# Patient Record
Sex: FEMALE | Race: WHITE | NOT HISPANIC OR LATINO | Employment: UNEMPLOYED | URBAN - METROPOLITAN AREA
[De-identification: names, ages, dates, MRNs, and addresses within clinical notes are randomized per-mention and may not be internally consistent; named-entity substitution may affect disease eponyms.]

---

## 2022-05-10 ENCOUNTER — APPOINTMENT (OUTPATIENT)
Dept: RADIOLOGY | Facility: MEDICAL CENTER | Age: 42
End: 2022-05-10
Attending: EMERGENCY MEDICINE

## 2022-05-10 ENCOUNTER — HOSPITAL ENCOUNTER (EMERGENCY)
Facility: MEDICAL CENTER | Age: 42
End: 2022-05-10
Attending: EMERGENCY MEDICINE

## 2022-05-10 VITALS
DIASTOLIC BLOOD PRESSURE: 74 MMHG | BODY MASS INDEX: 27.81 KG/M2 | HEART RATE: 65 BPM | OXYGEN SATURATION: 98 % | WEIGHT: 162.92 LBS | TEMPERATURE: 97.2 F | HEIGHT: 64 IN | SYSTOLIC BLOOD PRESSURE: 121 MMHG | RESPIRATION RATE: 18 BRPM

## 2022-05-10 DIAGNOSIS — R07.9 CHEST PAIN, UNSPECIFIED TYPE: ICD-10-CM

## 2022-05-10 DIAGNOSIS — I49.3 PVCS (PREMATURE VENTRICULAR CONTRACTIONS): ICD-10-CM

## 2022-05-10 LAB
ALBUMIN SERPL BCP-MCNC: 4.4 G/DL (ref 3.2–4.9)
ALBUMIN/GLOB SERPL: 1.6 G/DL
ALP SERPL-CCNC: 76 U/L (ref 30–99)
ALT SERPL-CCNC: 16 U/L (ref 2–50)
ANION GAP SERPL CALC-SCNC: 12 MMOL/L (ref 7–16)
AST SERPL-CCNC: 19 U/L (ref 12–45)
BASOPHILS # BLD AUTO: 0.7 % (ref 0–1.8)
BASOPHILS # BLD: 0.06 K/UL (ref 0–0.12)
BILIRUB SERPL-MCNC: 0.5 MG/DL (ref 0.1–1.5)
BUN SERPL-MCNC: 14 MG/DL (ref 8–22)
CALCIUM SERPL-MCNC: 9.4 MG/DL (ref 8.4–10.2)
CHLORIDE SERPL-SCNC: 105 MMOL/L (ref 96–112)
CO2 SERPL-SCNC: 23 MMOL/L (ref 20–33)
CREAT SERPL-MCNC: 0.97 MG/DL (ref 0.5–1.4)
EKG IMPRESSION: NORMAL
EKG IMPRESSION: NORMAL
EOSINOPHIL # BLD AUTO: 0.22 K/UL (ref 0–0.51)
EOSINOPHIL NFR BLD: 2.6 % (ref 0–6.9)
ERYTHROCYTE [DISTWIDTH] IN BLOOD BY AUTOMATED COUNT: 41.1 FL (ref 35.9–50)
GFR SERPLBLD CREATININE-BSD FMLA CKD-EPI: 74 ML/MIN/1.73 M 2
GLOBULIN SER CALC-MCNC: 2.7 G/DL (ref 1.9–3.5)
GLUCOSE SERPL-MCNC: 107 MG/DL (ref 65–99)
HCG SERPL QL: NEGATIVE
HCT VFR BLD AUTO: 44.5 % (ref 37–47)
HGB BLD-MCNC: 15.7 G/DL (ref 12–16)
IMM GRANULOCYTES # BLD AUTO: 0.02 K/UL (ref 0–0.11)
IMM GRANULOCYTES NFR BLD AUTO: 0.2 % (ref 0–0.9)
LYMPHOCYTES # BLD AUTO: 3.26 K/UL (ref 1–4.8)
LYMPHOCYTES NFR BLD: 38.2 % (ref 22–41)
MCH RBC QN AUTO: 31.8 PG (ref 27–33)
MCHC RBC AUTO-ENTMCNC: 35.3 G/DL (ref 33.6–35)
MCV RBC AUTO: 90.1 FL (ref 81.4–97.8)
MONOCYTES # BLD AUTO: 0.62 K/UL (ref 0–0.85)
MONOCYTES NFR BLD AUTO: 7.3 % (ref 0–13.4)
NEUTROPHILS # BLD AUTO: 4.35 K/UL (ref 2–7.15)
NEUTROPHILS NFR BLD: 51 % (ref 44–72)
NRBC # BLD AUTO: 0 K/UL
NRBC BLD-RTO: 0 /100 WBC
PLATELET # BLD AUTO: 242 K/UL (ref 164–446)
PMV BLD AUTO: 9.7 FL (ref 9–12.9)
POTASSIUM SERPL-SCNC: 3.8 MMOL/L (ref 3.6–5.5)
PROT SERPL-MCNC: 7.1 G/DL (ref 6–8.2)
RBC # BLD AUTO: 4.94 M/UL (ref 4.2–5.4)
SODIUM SERPL-SCNC: 140 MMOL/L (ref 135–145)
TROPONIN T SERPL-MCNC: <6 NG/L (ref 6–19)
TROPONIN T SERPL-MCNC: <6 NG/L (ref 6–19)
WBC # BLD AUTO: 8.5 K/UL (ref 4.8–10.8)

## 2022-05-10 PROCEDURE — 80053 COMPREHEN METABOLIC PANEL: CPT

## 2022-05-10 PROCEDURE — 71045 X-RAY EXAM CHEST 1 VIEW: CPT

## 2022-05-10 PROCEDURE — 93005 ELECTROCARDIOGRAM TRACING: CPT | Performed by: EMERGENCY MEDICINE

## 2022-05-10 PROCEDURE — 84484 ASSAY OF TROPONIN QUANT: CPT

## 2022-05-10 PROCEDURE — 99285 EMERGENCY DEPT VISIT HI MDM: CPT

## 2022-05-10 PROCEDURE — 84703 CHORIONIC GONADOTROPIN ASSAY: CPT

## 2022-05-10 PROCEDURE — 36415 COLL VENOUS BLD VENIPUNCTURE: CPT

## 2022-05-10 PROCEDURE — 85025 COMPLETE CBC W/AUTO DIFF WBC: CPT

## 2022-05-10 RX ORDER — IBUPROFEN 200 MG
400 TABLET ORAL EVERY 6 HOURS PRN
Status: SHIPPED | COMMUNITY
End: 2022-06-07

## 2022-05-10 ASSESSMENT — PAIN DESCRIPTION - PAIN TYPE: TYPE: ACUTE PAIN

## 2022-05-10 ASSESSMENT — LIFESTYLE VARIABLES
TOTAL SCORE: 0
HAVE PEOPLE ANNOYED YOU BY CRITICIZING YOUR DRINKING: NO
EVER FELT BAD OR GUILTY ABOUT YOUR DRINKING: NO
DO YOU DRINK ALCOHOL: NO
HAVE YOU EVER FELT YOU SHOULD CUT DOWN ON YOUR DRINKING: NO
CONSUMPTION TOTAL: INCOMPLETE
EVER HAD A DRINK FIRST THING IN THE MORNING TO STEADY YOUR NERVES TO GET RID OF A HANGOVER: NO
TOTAL SCORE: 0
TOTAL SCORE: 0

## 2022-05-10 NOTE — ED TRIAGE NOTES
"Chief Complaint   Patient presents with   • Chest Pain     Chest pain/tightness began 10 days ago accompanied by \"feels my heart is skipping\" however this morning she woke up feeling her heart was racing for about 10 minutes then went back to \"skipping beats\".  No fever, no cough, no nausea, no vomiting.       /97   Pulse 80   Temp 36.3 °C (97.4 °F) (Temporal)   Resp 18   Ht 1.626 m (5' 4\")   Wt 73.9 kg (162 lb 14.7 oz)   LMP 04/10/2022 (Within Months)   SpO2 98%   Breastfeeding No   BMI 27.97 kg/m²     Not covid vaccinated.  "

## 2022-05-10 NOTE — ED NOTES
0910 All results back, chart up for MD for re evaluation. poc update given to pt. No further questions at this time. Further orders and dispo pending

## 2022-05-10 NOTE — ED PROVIDER NOTES
"ED Provider Note    CHIEF COMPLAINT  Chief Complaint   Patient presents with   • Chest Pain     Chest pain/tightness began 10 days ago accompanied by \"feels my heart is skipping\" however this morning she woke up feeling her heart was racing for about 10 minutes then went back to \"skipping beats\".  No fever, no cough, no nausea, no vomiting.         HPI  Arabella Lux is a 42 y.o. female who presents for evaluation of chest discomfort.  The patient reports that around 10 days ago she started feeling some chest discomfort.  She also noticed symptoms of dyspepsia and reported \"heartburn \".  She denies pleuritic discomfort leg swelling or hemoptysis.  No report of any dyspnea or chest pain on exertion.  Pain does not radiate to her back or shoulder blades.  She specifically denies any strokelike symptom such as numbness weakness tingling to the arms legs or face.  No associated fevers chills or productive cough.  Patient does smoke cigarettes but does not partake in any alcohol or illicit drug use.  No associated or known history of early coronary artery disease or thromboembolic disease.    REVIEW OF SYSTEMS  See HPI for further details.  No high fevers chills night sweats weight loss all other systems are negative.     PAST MEDICAL HISTORY  Past Medical History:   Diagnosis Date   • Hx of viral meningitis 2007    States she has recurrent viral meningitis       FAMILY HISTORY  Noncontributory    SOCIAL HISTORY  Social History     Socioeconomic History   • Marital status:    Tobacco Use   • Smoking status: Current Every Day Smoker     Packs/day: 1.50     Types: Cigarettes   • Smokeless tobacco: Never Used   Vaping Use   • Vaping Use: Never used   Substance and Sexual Activity   • Alcohol use: Not Currently   • Drug use: Not Currently   Smoking history no illicit drug use    SURGICAL HISTORY  No past surgical history on file.  No major surgeries  CURRENT MEDICATIONS  Home Medications     Reviewed by Shari MC" "ANNY Riddle (Registered Nurse) on 05/10/22 at 0544  Med List Status: Complete   Medication Last Dose Status        Patient Kenneth Taking any Medications                       ALLERGIES  No Known Allergies    PHYSICAL EXAM  VITAL SIGNS: /75   Pulse 67   Temp 36.3 °C (97.4 °F) (Temporal)   Resp 18   Ht 1.626 m (5' 4\")   Wt 73.9 kg (162 lb 14.7 oz)   LMP 04/10/2022 (Within Months)   SpO2 97%   Breastfeeding No   BMI 27.97 kg/m²       Constitutional: Well developed, Well nourished, No acute distress, Non-toxic appearance.   HENT: Normocephalic, Atraumatic, Bilateral external ears normal, Oropharynx moist, No oral exudates, Nose normal.   Eyes: PERRLA, EOMI, Conjunctiva normal, No discharge.   Neck: Normal range of motion, No tenderness, Supple, No stridor.    Cardiovascular: Normal heart rate, Normal rhythm, No murmurs, No rubs, No gallops.   Thorax & Lungs: Normal breath sounds, No respiratory distress, No wheezing, No chest tenderness.   Abdomen: Bowel sounds normal, Soft, No tenderness, No masses, No pulsatile masses.   Skin: Warm, Dry, No erythema, No rash.   Back: No tenderness, No CVA tenderness.   Extremities: Intact distal pulses, No edema, No tenderness, No cyanosis, No clubbing.   Neurologic: Alert & oriented x 3, Normal motor function, Normal sensory function, No focal deficits noted.   Psychiatric: Anxious.     DX-CHEST-PORTABLE (1 VIEW)   Final Result      No acute cardiopulmonary abnormality.           Results for orders placed or performed during the hospital encounter of 05/10/22   CBC WITH DIFFERENTIAL   Result Value Ref Range    WBC 8.5 4.8 - 10.8 K/uL    RBC 4.94 4.20 - 5.40 M/uL    Hemoglobin 15.7 12.0 - 16.0 g/dL    Hematocrit 44.5 37.0 - 47.0 %    MCV 90.1 81.4 - 97.8 fL    MCH 31.8 27.0 - 33.0 pg    MCHC 35.3 (H) 33.6 - 35.0 g/dL    RDW 41.1 35.9 - 50.0 fL    Platelet Count 242 164 - 446 K/uL    MPV 9.7 9.0 - 12.9 fL    Neutrophils-Polys 51.00 44.00 - 72.00 %    Lymphocytes 38.20 " 22.00 - 41.00 %    Monocytes 7.30 0.00 - 13.40 %    Eosinophils 2.60 0.00 - 6.90 %    Basophils 0.70 0.00 - 1.80 %    Immature Granulocytes 0.20 0.00 - 0.90 %    Nucleated RBC 0.00 /100 WBC    Neutrophils (Absolute) 4.35 2.00 - 7.15 K/uL    Lymphs (Absolute) 3.26 1.00 - 4.80 K/uL    Monos (Absolute) 0.62 0.00 - 0.85 K/uL    Eos (Absolute) 0.22 0.00 - 0.51 K/uL    Baso (Absolute) 0.06 0.00 - 0.12 K/uL    Immature Granulocytes (abs) 0.02 0.00 - 0.11 K/uL    NRBC (Absolute) 0.00 K/uL   COMP METABOLIC PANEL   Result Value Ref Range    Sodium 140 135 - 145 mmol/L    Potassium 3.8 3.6 - 5.5 mmol/L    Chloride 105 96 - 112 mmol/L    Co2 23 20 - 33 mmol/L    Anion Gap 12.0 7.0 - 16.0    Glucose 107 (H) 65 - 99 mg/dL    Bun 14 8 - 22 mg/dL    Creatinine 0.97 0.50 - 1.40 mg/dL    Calcium 9.4 8.4 - 10.2 mg/dL    AST(SGOT) 19 12 - 45 U/L    ALT(SGPT) 16 2 - 50 U/L    Alkaline Phosphatase 76 30 - 99 U/L    Total Bilirubin 0.5 0.1 - 1.5 mg/dL    Albumin 4.4 3.2 - 4.9 g/dL    Total Protein 7.1 6.0 - 8.2 g/dL    Globulin 2.7 1.9 - 3.5 g/dL    A-G Ratio 1.6 g/dL   TROPONIN   Result Value Ref Range    Troponin T <6 6 - 19 ng/L   BETA-HCG QUALITATIVE SERUM   Result Value Ref Range    Beta-Hcg Qualitative Serum Negative Negative   ESTIMATED GFR   Result Value Ref Range    GFR (CKD-EPI) 74 >60 mL/min/1.73 m 2   TROPONIN   Result Value Ref Range    Troponin T <6 6 - 19 ng/L   EKG (NOW)   Result Value Ref Range    Report       [facility] Emergency Dept.    Test Date:  2022-05-10  Pt Name:    ROCIO ANTUNEZ                  Department: [facility]  MRN:        [id number]                      Room:  Gender:     Female                       Technician: [id number]  :        1980                   Requested By:CALEB SIMMONS  Order #:    [id number]                    Reading MD:    Measurements  Intervals                                Axis  Rate:       79                           P:          70  AR:         149                           QRS:        -51  QRSD:       90                           T:          37  QT:         375  QTc:        430    Interpretive Statements  Sinus rhythm  Probable left atrial enlargement  Inferior infarct, old  No previous ECG available for comparison     EKG   Result Value Ref Range    Report       [facility] Emergency Dept.    Test Date:  2022-05-10  Pt Name:    ROCIO ANTUNEZ                  Department[facility]  MRN:        [id number]                      [facility]   Gender:     Female                       Technician: [id number]  :        1980                   Requested By:BELLA JIMENEZ  Order #:    [id number]                    Reading MD:    Measurements  Intervals                                Axis  Rate:       75                           P:          67  NC:         153                          QRS:        -35  QRSD:       91                           T:          57  QT:         385  QTc:        430    Interpretive Statements  Sinus arrhythmia  Ventricular premature complex  Left axis deviation  Low voltage, precordial leads  Compared to ECG 05/10/2022 05:36:21  Ventricular premature complex(es) now present  Left-axis deviation now present  Low QRS voltage now present  Sinus rhythm no longer present  Myocardial infarct finding no longer pr esent         COURSE & MEDICAL DECISION MAKING  Pertinent Labs & Imaging studies reviewed. (See chart for details)  Patient presents here with what I would consider atypical chest discomfort.  Is been present for almost 2 weeks and she also has some dyspepsia and acid reflux type symptoms.  Other than smoking she does not have any significant objective risk factors for coronary artery disease including longstanding hypertension diabetes dyslipidemia or strong family history. HEART SCORE 1. Her EKG does not demonstrate any ischemia and her she had 2-hour serial troponins both of which are nondetectable.  Her PE RC score is 0.   Chest x-ray did not demonstrate any abnormalities such as heart failure or enlarged heart pneumonia etc.  She was on the monitor here for several hours.  She did have some frequent PVCs but no suggestion of nonsustained or sustained ventricular tachycardia.  Electrolytes are normal.  I have recommended we start her on an over-the-counter acid blocker and refer her to cardiology to determine if Zio patch or other sort of monitoring would be indicated    FINAL IMPRESSION  1.  Atypical chest pain  2.  Frequent PVCs         Electronically signed by: Red Mendoza M.D., 5/10/2022 6:04 AM

## 2022-05-31 ENCOUNTER — TELEPHONE (OUTPATIENT)
Dept: SCHEDULING | Facility: IMAGING CENTER | Age: 42
End: 2022-05-31

## 2022-05-31 SDOH — HEALTH STABILITY: MENTAL HEALTH
STRESS IS WHEN SOMEONE FEELS TENSE, NERVOUS, ANXIOUS, OR CAN'T SLEEP AT NIGHT BECAUSE THEIR MIND IS TROUBLED. HOW STRESSED ARE YOU?: TO SOME EXTENT

## 2022-05-31 SDOH — ECONOMIC STABILITY: TRANSPORTATION INSECURITY
IN THE PAST 12 MONTHS, HAS THE LACK OF TRANSPORTATION KEPT YOU FROM MEDICAL APPOINTMENTS OR FROM GETTING MEDICATIONS?: NO

## 2022-05-31 SDOH — ECONOMIC STABILITY: TRANSPORTATION INSECURITY
IN THE PAST 12 MONTHS, HAS LACK OF RELIABLE TRANSPORTATION KEPT YOU FROM MEDICAL APPOINTMENTS, MEETINGS, WORK OR FROM GETTING THINGS NEEDED FOR DAILY LIVING?: NO

## 2022-05-31 SDOH — HEALTH STABILITY: PHYSICAL HEALTH: ON AVERAGE, HOW MANY DAYS PER WEEK DO YOU ENGAGE IN MODERATE TO STRENUOUS EXERCISE (LIKE A BRISK WALK)?: 1 DAY

## 2022-05-31 SDOH — ECONOMIC STABILITY: INCOME INSECURITY: HOW HARD IS IT FOR YOU TO PAY FOR THE VERY BASICS LIKE FOOD, HOUSING, MEDICAL CARE, AND HEATING?: NOT VERY HARD

## 2022-05-31 SDOH — ECONOMIC STABILITY: HOUSING INSECURITY: IN THE LAST 12 MONTHS, HOW MANY PLACES HAVE YOU LIVED?: 1

## 2022-05-31 SDOH — ECONOMIC STABILITY: FOOD INSECURITY: WITHIN THE PAST 12 MONTHS, THE FOOD YOU BOUGHT JUST DIDN'T LAST AND YOU DIDN'T HAVE MONEY TO GET MORE.: NEVER TRUE

## 2022-05-31 SDOH — ECONOMIC STABILITY: INCOME INSECURITY: IN THE LAST 12 MONTHS, WAS THERE A TIME WHEN YOU WERE NOT ABLE TO PAY THE MORTGAGE OR RENT ON TIME?: NO

## 2022-05-31 SDOH — ECONOMIC STABILITY: TRANSPORTATION INSECURITY
IN THE PAST 12 MONTHS, HAS LACK OF TRANSPORTATION KEPT YOU FROM MEETINGS, WORK, OR FROM GETTING THINGS NEEDED FOR DAILY LIVING?: NO

## 2022-05-31 SDOH — ECONOMIC STABILITY: HOUSING INSECURITY
IN THE LAST 12 MONTHS, WAS THERE A TIME WHEN YOU DID NOT HAVE A STEADY PLACE TO SLEEP OR SLEPT IN A SHELTER (INCLUDING NOW)?: NO

## 2022-05-31 SDOH — ECONOMIC STABILITY: FOOD INSECURITY: WITHIN THE PAST 12 MONTHS, YOU WORRIED THAT YOUR FOOD WOULD RUN OUT BEFORE YOU GOT MONEY TO BUY MORE.: NEVER TRUE

## 2022-05-31 SDOH — HEALTH STABILITY: PHYSICAL HEALTH: ON AVERAGE, HOW MANY MINUTES DO YOU ENGAGE IN EXERCISE AT THIS LEVEL?: 20 MIN

## 2022-05-31 ASSESSMENT — SOCIAL DETERMINANTS OF HEALTH (SDOH)
IN A TYPICAL WEEK, HOW MANY TIMES DO YOU TALK ON THE PHONE WITH FAMILY, FRIENDS, OR NEIGHBORS?: ONCE A WEEK
HOW OFTEN DO YOU HAVE A DRINK CONTAINING ALCOHOL: NEVER
DO YOU BELONG TO ANY CLUBS OR ORGANIZATIONS SUCH AS CHURCH GROUPS UNIONS, FRATERNAL OR ATHLETIC GROUPS, OR SCHOOL GROUPS?: NO
HOW OFTEN DO YOU ATTEND CHURCH OR RELIGIOUS SERVICES?: MORE THAN 4 TIMES PER YEAR
HOW HARD IS IT FOR YOU TO PAY FOR THE VERY BASICS LIKE FOOD, HOUSING, MEDICAL CARE, AND HEATING?: NOT VERY HARD
HOW MANY DRINKS CONTAINING ALCOHOL DO YOU HAVE ON A TYPICAL DAY WHEN YOU ARE DRINKING: PATIENT DOES NOT DRINK
WITHIN THE PAST 12 MONTHS, YOU WORRIED THAT YOUR FOOD WOULD RUN OUT BEFORE YOU GOT THE MONEY TO BUY MORE: NEVER TRUE
DO YOU BELONG TO ANY CLUBS OR ORGANIZATIONS SUCH AS CHURCH GROUPS UNIONS, FRATERNAL OR ATHLETIC GROUPS, OR SCHOOL GROUPS?: NO
IN A TYPICAL WEEK, HOW MANY TIMES DO YOU TALK ON THE PHONE WITH FAMILY, FRIENDS, OR NEIGHBORS?: ONCE A WEEK
HOW OFTEN DO YOU GET TOGETHER WITH FRIENDS OR RELATIVES?: ONCE A WEEK
HOW OFTEN DO YOU HAVE SIX OR MORE DRINKS ON ONE OCCASION: NEVER
HOW OFTEN DO YOU GET TOGETHER WITH FRIENDS OR RELATIVES?: ONCE A WEEK
HOW OFTEN DO YOU ATTEND CHURCH OR RELIGIOUS SERVICES?: MORE THAN 4 TIMES PER YEAR

## 2022-05-31 ASSESSMENT — LIFESTYLE VARIABLES
HOW OFTEN DO YOU HAVE SIX OR MORE DRINKS ON ONE OCCASION: NEVER
HOW MANY STANDARD DRINKS CONTAINING ALCOHOL DO YOU HAVE ON A TYPICAL DAY: PATIENT DOES NOT DRINK
HOW OFTEN DO YOU HAVE A DRINK CONTAINING ALCOHOL: NEVER
AUDIT-C TOTAL SCORE: 0
SKIP TO QUESTIONS 9-10: 1

## 2022-06-07 ENCOUNTER — OFFICE VISIT (OUTPATIENT)
Dept: MEDICAL GROUP | Facility: LAB | Age: 42
End: 2022-06-07
Payer: COMMERCIAL

## 2022-06-07 VITALS
HEIGHT: 64 IN | WEIGHT: 164 LBS | DIASTOLIC BLOOD PRESSURE: 68 MMHG | TEMPERATURE: 97.8 F | RESPIRATION RATE: 16 BRPM | BODY MASS INDEX: 28 KG/M2 | SYSTOLIC BLOOD PRESSURE: 104 MMHG | HEART RATE: 96 BPM | OXYGEN SATURATION: 98 %

## 2022-06-07 DIAGNOSIS — Z76.89 ENCOUNTER TO ESTABLISH CARE WITH NEW DOCTOR: ICD-10-CM

## 2022-06-07 DIAGNOSIS — F41.9 ANXIETY: ICD-10-CM

## 2022-06-07 DIAGNOSIS — Z12.39 ENCOUNTER FOR BREAST CANCER SCREENING USING NON-MAMMOGRAM MODALITY: ICD-10-CM

## 2022-06-07 DIAGNOSIS — N92.6 IRREGULAR MENSES: ICD-10-CM

## 2022-06-07 DIAGNOSIS — G03.2 MOLLARET'S SYNDROME (BENIGN RECURRENT MENINGITIS): ICD-10-CM

## 2022-06-07 DIAGNOSIS — I49.3 PVC (PREMATURE VENTRICULAR CONTRACTION): ICD-10-CM

## 2022-06-07 DIAGNOSIS — R53.83 OTHER FATIGUE: ICD-10-CM

## 2022-06-07 PROCEDURE — 99204 OFFICE O/P NEW MOD 45 MIN: CPT | Performed by: FAMILY MEDICINE

## 2022-06-07 ASSESSMENT — FIBROSIS 4 INDEX: FIB4 SCORE: 0.84

## 2022-06-07 ASSESSMENT — PATIENT HEALTH QUESTIONNAIRE - PHQ9
5. POOR APPETITE OR OVEREATING: 2 - MORE THAN HALF THE DAYS
CLINICAL INTERPRETATION OF PHQ2 SCORE: 1
SUM OF ALL RESPONSES TO PHQ QUESTIONS 1-9: 10

## 2022-06-07 NOTE — PROGRESS NOTES
CC: Here to establish care, with multiple medical concerns    HPI: New patient  Arabella presents today to establish care, 42 years old female, with past medical history significant for history of PVCs, history of recurrent meningitis.  Moved recently from [place], discussed the following today:    1. Encounter to establish care with new doctor  Reviewed past medical problems, past surgical history, family/social history, and medications.  Patient is no records available at this time.  Requesting records today.  Moved recently from [place], lives with her , works as part-time.    2. PVC (premature ventricular contraction)  Status post recent ER visit, patient has an appointment to establish and follow-up with cardiology for further evaluation of PVCs.  Records from the hospital reviewed.  Denies palpitations at this time but overall not feeling well    3. 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  Denies symptoms like neck rigidity or pain, reports mild headache on and off.    4. Irregular menses  History of irregular menstruation, this is chronic for the patient for the past few years.  Reports some occasional headaches sometimes, would like further work-up with hormonal check last menstruation 2 months ago.    5. Other fatigue  Chronic for the patient, new to me patient said for almost the past 2 years has been experiencing fatigue low energy tiredness occasional headaches, just not feeling well overall associated with weight gain and hair loss.  Patient also relates it to psychological trauma and event that she did not want to talk about today.  Reports weight gain.      6. Anxiety  Patient reports that around 1 or 2 years ago she had a severe psychological trauma that affected her health in general.  And she has been feeling very tired and anxious all the time,  interested in psychotherapy referral, denies intentions to harm self or others, patient did not want to talk much about the psychological trauma at this time.  But she was very emotional when she was asked about it.    7. Encounter for breast cancer screening using non-mammogram modality  Declines mammogram  Patient is requesting an order for ultrasound for breast cancer screening, denies mass or pain or nipple discharge.      Patient Active Problem List    Diagnosis Date Noted   • Encounter to establish care with new doctor 06/07/2022   • PVC (premature ventricular contraction) 06/07/2022   • Mollaret's syndrome (benign recurrent meningitis) 06/07/2022   • Irregular menses 06/07/2022   • Anxiety 06/07/2022       No current outpatient medications on file.     No current facility-administered medications for this visit.         Allergies as of 06/07/2022 - Reviewed 06/07/2022   Allergen Reaction Noted   • Clindamycin Vomiting and Nausea 05/10/2022   • Sulfa drugs  06/07/2022        ROS: Denies any chest pain, Shortness of breath, Changes bowel or bladder, Lower extremity edema.    Physical Exam:  Gen.: Well-developed, well-nourished, no apparent distress,pleasant and cooperative with the examination  Skin:  Warm and dry with good turgor. No rashes or suspicious lesions in visible areas  Eye: PERRLA, conjunctiva and sclera clear, lids normal  HEENT: Normocephalic/atraumatic, sinuses nontender with palpation, TMs clear, nares patent with pink mucosa and clear rhinorrhea, lips without lesions, oropharynx clear.  Neck: Trachea midline,no masses or adenopathy  Thyroid: normal consistency and size. No masses or nodules. Not tender with palpation.  Cor: Regular rate and rhythm without murmur, gallop or rub.  Lungs: Respirations unlabored.Clear to auscultation with equal breath sounds bilaterally. No wheezes, rhonchi.  Abdomen: Soft nontender without hepatosplenomegaly or masses appreciated, normoactive bowel sounds. No  hernias.  Extremities: No cyanosis, clubbing or edema, Symmetrical without deformities or malformations. Pulses 2+ and symmetrical both upper and lower extremities  Lymphatic: No abnormal adenopathy of the neck groin or axillae.  Psych: Alert and oriented x 3.Normal affect, judgement,insight and memory.        Assessment and Plan.   42 y.o. female **here to establish care    1. Encounter to establish care with new doctor  **Reviewed medical history as above    2. PVC (premature ventricular contraction)   new concern status post ER visit.  Patient has an appointment to establish with cardiology for further evaluation, advised to check thyroid function, and lipid profile.  Differential diagnosis might also includes anxiety  - TSH; Future  - FREE THYROXINE; Future  - Lipid Profile; Future    3. Mollaret's syndrome (benign recurrent meningitis)  Chronic for the patient, asymptomatic at this time no red flags.  Watchful waiting recommended    4. Irregular menses  Chronic for the patient, new to me, advised to do some labs for further evaluation  - FSH/LH; Future  - PROLACTIN; Future    5. Other fatigue  Chronic for the patient, new to me.  Advised to do more labs, reviewed most recent labs at emergency room, no evidence of anemia or kidney disease.  - VITAMIN D,25 HYDROXY; Future    6. Anxiety  Chronic, patient to follow-up with psychotherapy for further evaluation, did not want to talk about the psychological trauma at this time.  - Referral to Behavioral Health    7. Encounter for breast cancer screening using non-mammogram modality  Declines mammogram.  Never had one before asymptomatic  - US-BREAST BILAT-COMPLETE; Future       Please note that this dictation was created using voice recognition software. I have made every reasonable attempt to correct obvious errors but there may be errors of grammar and content that I may have overlooked prior to finalization of this note.

## 2022-06-16 ENCOUNTER — HOSPITAL ENCOUNTER (OUTPATIENT)
Dept: LAB | Facility: MEDICAL CENTER | Age: 42
End: 2022-06-16
Attending: FAMILY MEDICINE
Payer: COMMERCIAL

## 2022-06-16 DIAGNOSIS — I49.3 PVC (PREMATURE VENTRICULAR CONTRACTION): ICD-10-CM

## 2022-06-16 DIAGNOSIS — N92.6 IRREGULAR MENSES: ICD-10-CM

## 2022-06-16 DIAGNOSIS — R53.83 OTHER FATIGUE: ICD-10-CM

## 2022-06-16 LAB
25(OH)D3 SERPL-MCNC: 27 NG/ML (ref 30–100)
CHOLEST SERPL-MCNC: 195 MG/DL (ref 100–199)
HDLC SERPL-MCNC: 50 MG/DL
LDLC SERPL CALC-MCNC: 115 MG/DL
LH SERPL-ACNC: 27.6 IU/L
PROLACTIN SERPL-MCNC: 6.17 NG/ML (ref 2.8–26)
T4 FREE SERPL-MCNC: 1.04 NG/DL (ref 0.93–1.7)
TRIGL SERPL-MCNC: 151 MG/DL (ref 0–149)
TSH SERPL DL<=0.005 MIU/L-ACNC: 1.55 UIU/ML (ref 0.38–5.33)

## 2022-06-16 PROCEDURE — 84146 ASSAY OF PROLACTIN: CPT

## 2022-06-16 PROCEDURE — 83001 ASSAY OF GONADOTROPIN (FSH): CPT | Mod: 91

## 2022-06-16 PROCEDURE — 84439 ASSAY OF FREE THYROXINE: CPT

## 2022-06-16 PROCEDURE — 82306 VITAMIN D 25 HYDROXY: CPT

## 2022-06-16 PROCEDURE — 83002 ASSAY OF GONADOTROPIN (LH): CPT

## 2022-06-16 PROCEDURE — 84443 ASSAY THYROID STIM HORMONE: CPT

## 2022-06-16 PROCEDURE — 36415 COLL VENOUS BLD VENIPUNCTURE: CPT

## 2022-06-16 PROCEDURE — 80061 LIPID PANEL: CPT

## 2022-06-17 LAB — MISCELLANEOUS LAB RESULT MISCLAB: NORMAL

## 2022-06-17 PROCEDURE — 36415 COLL VENOUS BLD VENIPUNCTURE: CPT

## 2022-06-19 LAB — TEST NAME 95000: NORMAL

## 2022-06-21 DIAGNOSIS — R53.83 OTHER FATIGUE: ICD-10-CM

## 2022-06-23 ENCOUNTER — HOSPITAL ENCOUNTER (OUTPATIENT)
Dept: LAB | Facility: MEDICAL CENTER | Age: 42
End: 2022-06-23
Attending: FAMILY MEDICINE
Payer: COMMERCIAL

## 2022-06-23 LAB — CORTIS SERPL-MCNC: 5.1 UG/DL (ref 0–23)

## 2022-06-23 PROCEDURE — 36415 COLL VENOUS BLD VENIPUNCTURE: CPT

## 2022-06-23 PROCEDURE — 82533 TOTAL CORTISOL: CPT

## 2022-06-28 ENCOUNTER — DOCUMENTATION (OUTPATIENT)
Dept: BEHAVIORAL HEALTH | Facility: MEDICAL CENTER | Age: 42
End: 2022-06-28
Payer: COMMERCIAL

## 2022-06-28 ENCOUNTER — HOSPITAL ENCOUNTER (OUTPATIENT)
Dept: BEHAVIORAL HEALTH | Facility: MEDICAL CENTER | Age: 42
End: 2022-06-28
Attending: PSYCHIATRY & NEUROLOGY
Payer: COMMERCIAL

## 2022-06-28 DIAGNOSIS — F41.1 GAD (GENERALIZED ANXIETY DISORDER): ICD-10-CM

## 2022-06-28 DIAGNOSIS — F32.1 CURRENT MODERATE EPISODE OF MAJOR DEPRESSIVE DISORDER, UNSPECIFIED WHETHER RECURRENT (HCC): ICD-10-CM

## 2022-06-28 PROCEDURE — 90832 PSYTX W PT 30 MINUTES: CPT | Performed by: MARRIAGE & FAMILY THERAPIST

## 2022-06-28 ASSESSMENT — ANXIETY QUESTIONNAIRES
IF YOU CHECKED OFF ANY PROBLEMS ON THIS QUESTIONNAIRE, HOW DIFFICULT HAVE THESE PROBLEMS MADE IT FOR YOU TO DO YOUR WORK, TAKE CARE OF THINGS AT HOME, OR GET ALONG WITH OTHER PEOPLE: SOMEWHAT DIFFICULT
3. WORRYING TOO MUCH ABOUT DIFFERENT THINGS: SEVERAL DAYS
4. TROUBLE RELAXING: SEVERAL DAYS
GAD7 TOTAL SCORE: 9
2. NOT BEING ABLE TO STOP OR CONTROL WORRYING: SEVERAL DAYS
6. BECOMING EASILY ANNOYED OR IRRITABLE: MORE THAN HALF THE DAYS
5. BEING SO RESTLESS THAT IT IS HARD TO SIT STILL: NOT AT ALL
7. FEELING AFRAID AS IF SOMETHING AWFUL MIGHT HAPPEN: MORE THAN HALF THE DAYS
1. FEELING NERVOUS, ANXIOUS, OR ON EDGE: MORE THAN HALF THE DAYS

## 2022-06-28 ASSESSMENT — PATIENT HEALTH QUESTIONNAIRE - PHQ9
SUM OF ALL RESPONSES TO PHQ QUESTIONS 1-9: 14
5. POOR APPETITE OR OVEREATING: 2 - MORE THAN HALF THE DAYS
CLINICAL INTERPRETATION OF PHQ2 SCORE: 4

## 2022-06-28 NOTE — PROGRESS NOTES
[facility]  Assessment Center Note    Patient Name: Arabella Lux  Patient MRN: [id number]  Today's Date: 6/28/2022     Length of session: 30 minutes  Persons in attendance:Patient     Subjective/New Info: Please see scan of Triage assessment in Media tab    Depression Screen (PHQ-2/PHQ-9) 6/7/2022 6/28/2022   PHQ-2 Total Score 1 4   PHQ-9 Total Score 10 14       Interpretation of PHQ-9 Total Score   Score Severity   1-4 No Depression   5-9 Mild Depression   10-14 Moderate Depression   15-19 Moderately Severe Depression   20-27 Severe Depression    GHADA 7 6/28/2022   GHADA-7 Total Score 9       Interpretation of GHADA 7 Total Score   Score Severity:  0-4 No Anxiety   5-9 Mild Anxiety  10-14 Moderate Anxiety  15-21 Severe Anxiety    Objective/Observations:   Participation: Active verbal participation   Grooming: Casual   Cognition: Alert and Fully Oriented   Eye contact: Indirect   Mood: Depressed and Anxious   Affect: Constricted   Thought process: Logical and Goal-directed   Speech: Rate within normal limits and Volume within normal limits   Other:     [place] Suicide Severity Rating Scale     1. Wish to be Dead?:    2. Suicidal Thoughts: No    3. Suicidal Thoughts with Method Without Specific Plan or Intent to Act:    4. Suicidal Intent Without Specific Plan:    5. Suicide Intent with Specific Plan:    6. Suicide Behavior Question: No  How long ago did you do any of these?:    C-SSRS Risk Level:      Additional Suicide Screening Questions    Suspected or Confirmed Suicide Attempted?: No  Harming or killing others?: No    [place] Suicide Reassessment     New or continued thoughts about killing self?: No  Preparing to end life?: No    Diagnoses:   1. Current moderate episode of major depressive disorder, unspecified whether recurrent (HCC)    2. GHADA (generalized anxiety disorder)         Current risk:   SUICIDE: Low   Homicide: Low   Self-harm: Low   Relapse: Low   Other:    Safety Plan reviewed? Not  Indicated   If evidence of imminent risk is present, intervention/plan:     Plan:  Patient will schedule for outpatient counseling and outpatient psychiatry and was provided information on the IOP program at [facility].  She was provided contact information.  Patient would benefit from IOP participation to reduce symptoms of anxiety and depression.    NICHOLAS Johnson.  6/28/2022

## 2022-06-30 ENCOUNTER — OFFICE VISIT (OUTPATIENT)
Dept: MEDICAL GROUP | Facility: LAB | Age: 42
End: 2022-06-30
Payer: COMMERCIAL

## 2022-06-30 VITALS
RESPIRATION RATE: 16 BRPM | HEIGHT: 64 IN | BODY MASS INDEX: 28.34 KG/M2 | SYSTOLIC BLOOD PRESSURE: 110 MMHG | WEIGHT: 166 LBS | HEART RATE: 82 BPM | TEMPERATURE: 97.4 F | OXYGEN SATURATION: 97 % | DIASTOLIC BLOOD PRESSURE: 70 MMHG

## 2022-06-30 DIAGNOSIS — R79.89 LOW VITAMIN D LEVEL: ICD-10-CM

## 2022-06-30 DIAGNOSIS — E78.49 OTHER HYPERLIPIDEMIA: ICD-10-CM

## 2022-06-30 DIAGNOSIS — N92.6 IRREGULAR MENSTRUATION: ICD-10-CM

## 2022-06-30 DIAGNOSIS — F41.8 DEPRESSION WITH ANXIETY: ICD-10-CM

## 2022-06-30 PROCEDURE — 99214 OFFICE O/P EST MOD 30 MIN: CPT | Performed by: FAMILY MEDICINE

## 2022-06-30 RX ORDER — ERGOCALCIFEROL 1.25 MG/1
50000 CAPSULE ORAL
Qty: 12 CAPSULE | Refills: 0 | Status: SHIPPED | OUTPATIENT
Start: 2022-06-30

## 2022-06-30 RX ORDER — VENLAFAXINE HYDROCHLORIDE 37.5 MG/1
37.5 CAPSULE, EXTENDED RELEASE ORAL DAILY
Qty: 30 CAPSULE | Refills: 3 | Status: SHIPPED | OUTPATIENT
Start: 2022-06-30 | End: 2022-07-07

## 2022-06-30 ASSESSMENT — FIBROSIS 4 INDEX: FIB4 SCORE: 0.84

## 2022-06-30 NOTE — PROGRESS NOTES
Chief Complaint:   Chief Complaint   Patient presents with   • Follow-Up       HPI: Established patient  Arabella Lux is a 42 y.o. female who presents for follow-up, discussed the following today:    1. Depression with anxiety  Chronic, ongoing for almost 2 years now.  Patient has an appointment to establish with behavioral health in 1 month and a half.  Denies suicidal ideation at this time.  Discussed with the patient to start her on medication to see if that can help, all labs reviewed and discussed with the patient today    2. Low vitamin D level  Subnormal level of vitamin D, discussed with the patient supplements to improve the level    3. Other hyperlipidemia  Discussed with the patient elevation of triglycerides 151, .  No previous labs to compare    4. Irregular menstruation  History of irregular menstruation for the past 1 year, no pelvic pain, associated with decreased libido, discussed pelvic ultrasound and follow-up with gynecology today          Past medical history, family history, social history and medications reviewed and updated in the record.  Today  Current medications, problem list and allergies reviewed in [employer] today  Health maintenance topics are reviewed and updated.    Patient Active Problem List    Diagnosis Date Noted   • Encounter to establish care with new doctor 06/07/2022   • Depression with anxiety 06/30/2022   • Low vitamin D level 06/30/2022   • Other hyperlipidemia 06/30/2022   • PVC (premature ventricular contraction) 06/07/2022   • Mollaret's syndrome (benign recurrent meningitis) 06/07/2022   • Irregular menses 06/07/2022   • Anxiety 06/07/2022     Family History   Problem Relation Age of Onset   • Alcohol abuse Father    • Cancer Paternal Uncle    • Cancer Maternal Grandmother         breast ca   • Breast Cancer Maternal Grandmother    • Diabetes Maternal Grandmother    • Glaucoma Maternal Grandmother      Social History     Socioeconomic History   • Marital  status:      Spouse name: Not on file   • Number of children: Not on file   • Years of education: Not on file   • Highest education level: Associate degree: occupational, technical, or vocational program   Occupational History   • Occupation: part time   Tobacco Use   • Smoking status: Current Every Day Smoker     Packs/day: 1.50     Years: 25.00     Pack years: 37.50     Types: Cigarettes   • Smokeless tobacco: Never Used   Vaping Use   • Vaping Use: Never used   Substance and Sexual Activity   • Alcohol use: Never   • Drug use: Never   • Sexual activity: Yes     Partners: Male   Other Topics Concern   • Not on file   Social History Narrative   • Not on file     Social Determinants of Health     Financial Resource Strain: Low Risk    • Difficulty of Paying Living Expenses: Not very hard   Food Insecurity: No Food Insecurity   • Worried About Running Out of Food in the Last Year: Never true   • Ran Out of Food in the Last Year: Never true   Transportation Needs: No Transportation Needs   • Lack of Transportation (Medical): No   • Lack of Transportation (Non-Medical): No   Physical Activity: Insufficiently Active   • Days of Exercise per Week: 1 day   • Minutes of Exercise per Session: 20 min   Stress: Stress Concern Present   • Feeling of Stress : To some extent   Social Connections: Moderately Isolated   • Frequency of Communication with Friends and Family: Once a week   • Frequency of Social Gatherings with Friends and Family: Once a week   • Attends Taoist Services: More than 4 times per year   • Active Member of Clubs or Organizations: No   • Attends Club or Organization Meetings: Not on file   • Marital Status:    Intimate Partner Violence: Not on file   Housing Stability: Low Risk    • Unable to Pay for Housing in the Last Year: No   • Number of Places Lived in the Last Year: 1   • Unstable Housing in the Last Year: No           Review Of Systems  As documented in HPI above  PHYSICAL  "EXAMINATION:    /70 (BP Location: Right arm, Patient Position: Sitting, BP Cuff Size: Adult)   Pulse 82   Temp 36.3 °C (97.4 °F) (Temporal)   Resp 16   Ht 1.626 m (5' 4\")   Wt 75.3 kg (166 lb)   SpO2 97%   BMI 28.49 kg/m²   Gen.: Well-developed, well-nourished, no apparent distress, pleasant and cooperative with the examination  HEENT: Normocephalic/atraumatic,   Neck: No JVD or bruits, no adenopathy  Cor: Regular rate and rhythm without murmur gallop or rub                ASSESSMENT/Plan:  1. Depression with anxiety   chronic, ongoing.  Discussed labs, thyroid function within normal limits, start Effexor as directed and follow-up in 4 weeks, also follow-up with behavioral health as directed.  No red flags today    venlafaxine XR (EFFEXOR XR) 37.5 MG CAPSULE SR 24 HR   2. Low vitamin D level   patient to take supplements of vitamin D and recheck the level in 3 months    ergocalciferol (DRISDOL) 68549 UNIT capsule   3. Other hyperlipidemia   discussed CT calcium scoring to assess cardiac risk CT-CARDIAC SCORING    CANCELED: CT-HEART W/O CONT EVAL CALCIUM   4. Irregular menstruation   chronic, ongoing.  Do an ultrasound and follow-up with gynecology for further evaluation.  Possibly related to dysfunctional uterine bleeding or hormonal imbalance, labs reviewed normal thyroid function and normal FSH LH and cortisol level.  HCG QUAL SERUM    US-PELVIC COMPLETE (TRANSABDOMINAL/TRANSVAGINAL) (COMBO)    Referral to Gynecology     Please note that this dictation was created using voice recognition software. I have made every reasonable attempt to correct obvious errors but there may be errors of grammar and content that I may have overlooked prior to finalization of this note.       "

## 2022-07-01 ENCOUNTER — HOSPITAL ENCOUNTER (OUTPATIENT)
Dept: BEHAVIORAL HEALTH | Facility: MEDICAL CENTER | Age: 42
End: 2022-07-01
Attending: PSYCHIATRY & NEUROLOGY
Payer: COMMERCIAL

## 2022-07-01 ENCOUNTER — TELEPHONE (OUTPATIENT)
Dept: CARDIOLOGY | Facility: MEDICAL CENTER | Age: 42
End: 2022-07-01
Payer: COMMERCIAL

## 2022-07-01 DIAGNOSIS — F32.1 MODERATE MAJOR DEPRESSION (HCC): ICD-10-CM

## 2022-07-01 DIAGNOSIS — F41.1 GENERALIZED ANXIETY DISORDER: ICD-10-CM

## 2022-07-01 PROCEDURE — 90791 PSYCH DIAGNOSTIC EVALUATION: CPT | Performed by: MARRIAGE & FAMILY THERAPIST

## 2022-07-01 ASSESSMENT — ANXIETY QUESTIONNAIRES
2. NOT BEING ABLE TO STOP OR CONTROL WORRYING: MORE THAN HALF THE DAYS
6. BECOMING EASILY ANNOYED OR IRRITABLE: MORE THAN HALF THE DAYS
IF YOU CHECKED OFF ANY PROBLEMS ON THIS QUESTIONNAIRE, HOW DIFFICULT HAVE THESE PROBLEMS MADE IT FOR YOU TO DO YOUR WORK, TAKE CARE OF THINGS AT HOME, OR GET ALONG WITH OTHER PEOPLE: SOMEWHAT DIFFICULT
4. TROUBLE RELAXING: SEVERAL DAYS
GAD7 TOTAL SCORE: 10
3. WORRYING TOO MUCH ABOUT DIFFERENT THINGS: MORE THAN HALF THE DAYS
7. FEELING AFRAID AS IF SOMETHING AWFUL MIGHT HAPPEN: SEVERAL DAYS
5. BEING SO RESTLESS THAT IT IS HARD TO SIT STILL: NOT AT ALL
1. FEELING NERVOUS, ANXIOUS, OR ON EDGE: MORE THAN HALF THE DAYS

## 2022-07-01 ASSESSMENT — PATIENT HEALTH QUESTIONNAIRE - PHQ9
SUM OF ALL RESPONSES TO PHQ QUESTIONS 1-9: 16
5. POOR APPETITE OR OVEREATING: 3 - NEARLY EVERY DAY
CLINICAL INTERPRETATION OF PHQ2 SCORE: 4
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
## 2022-07-01 NOTE — TELEPHONE ENCOUNTER
Chart Prep  S/W patient in regards to NP appt with Dr. Sol. Patient has not been seen by prior cardiologist, no cardiac testing/imaging done outside of [facility] and labs are most recent in Epic. Patient verbally confirmed time/date/location of appt.

## 2022-07-02 NOTE — PROGRESS NOTES
I have reviewed the note by CATIE Uribe and agree with the assessment and treatment plan.    1. Admit to Intensive Outpatient Program on 7/01/2022   - Group therapy per schedule,    - Psychoeducational groups per schedule,   - Individual/family counseling sessions with  per treatment plan.  2. Symptoms necessitating Intensive Outpatient Treatment: Depression/Anxiety  3. Medical screening/Physical exam per primary care provider or referring facility.    Ceasar Singh MD

## 2022-07-05 ENCOUNTER — HOSPITAL ENCOUNTER (OUTPATIENT)
Dept: BEHAVIORAL HEALTH | Facility: MEDICAL CENTER | Age: 42
End: 2022-07-05
Attending: PSYCHIATRY & NEUROLOGY
Payer: COMMERCIAL

## 2022-07-05 ENCOUNTER — OFFICE VISIT (OUTPATIENT)
Dept: CARDIOLOGY | Facility: MEDICAL CENTER | Age: 42
End: 2022-07-05
Attending: EMERGENCY MEDICINE
Payer: COMMERCIAL

## 2022-07-05 VITALS
RESPIRATION RATE: 16 BRPM | SYSTOLIC BLOOD PRESSURE: 110 MMHG | WEIGHT: 167 LBS | HEART RATE: 83 BPM | DIASTOLIC BLOOD PRESSURE: 70 MMHG | BODY MASS INDEX: 28.51 KG/M2 | OXYGEN SATURATION: 97 % | HEIGHT: 64 IN

## 2022-07-05 DIAGNOSIS — F32.1 MAJOR DEPRESSIVE DISORDER, SINGLE EPISODE, MODERATE (HCC): ICD-10-CM

## 2022-07-05 DIAGNOSIS — R00.2 PALPITATIONS: ICD-10-CM

## 2022-07-05 DIAGNOSIS — F41.1 GENERALIZED ANXIETY DISORDER: ICD-10-CM

## 2022-07-05 DIAGNOSIS — I49.3 PVC (PREMATURE VENTRICULAR CONTRACTION): ICD-10-CM

## 2022-07-05 LAB — EKG IMPRESSION: NORMAL

## 2022-07-05 PROCEDURE — 99204 OFFICE O/P NEW MOD 45 MIN: CPT | Performed by: INTERNAL MEDICINE

## 2022-07-05 PROCEDURE — 90853 GROUP PSYCHOTHERAPY: CPT | Performed by: MARRIAGE & FAMILY THERAPIST

## 2022-07-05 PROCEDURE — 93000 ELECTROCARDIOGRAM COMPLETE: CPT | Performed by: INTERNAL MEDICINE

## 2022-07-05 RX ORDER — POTASSIUM CHLORIDE 20 MEQ/1
20 TABLET, EXTENDED RELEASE ORAL 2 TIMES DAILY
Qty: 60 TABLET | Refills: 11 | Status: SHIPPED | OUTPATIENT
Start: 2022-07-05 | End: 2022-08-25

## 2022-07-05 ASSESSMENT — ENCOUNTER SYMPTOMS
DEPRESSION: 1
ABDOMINAL PAIN: 0
NERVOUS/ANXIOUS: 1
LOSS OF CONSCIOUSNESS: 0
DIZZINESS: 0
INSOMNIA: 0
SHORTNESS OF BREATH: 1
ORTHOPNEA: 0
BLURRED VISION: 1
PALPITATIONS: 1
FEVER: 0
MYALGIAS: 0
CHILLS: 0
PND: 0

## 2022-07-05 ASSESSMENT — FIBROSIS 4 INDEX: FIB4 SCORE: 0.84

## 2022-07-05 NOTE — GROUP NOTE
"Group Appointment Information    Date: 07/05/22   Attendance Duration: 60 minutes  Number of Participants: 7 participants  Program / Group: IOP - Intensive Outpatient Program  Topics Covered: Other (Comment):\"pillars of wellness\"        Group Therapy Start Time:  1:00 PM    Attendance: Attended  Participation: Active verbal participation and Attentive    Affect/Mood Range: Constricted  Affect/Mood Display: CWC - Congruent w/Content  Cognition: Alert and Oriented    Evidence of imminent suicide risk: No   Evidence of imminent homicide risk: No     Therapeutic Interventions: Psychoeducation and Cognitive clarification  Progress Toward Treatment Goal: No change; patient is new to group and appeared to feel somewhat insecure.     "

## 2022-07-05 NOTE — GROUP NOTE
"Group Appointment Information    Date: 07/05/22   Attendance Duration: 90 minutes  Number of Participants: 7 participants  Program / Group: IOP - Intensive Outpatient Program  Topics Covered: Other (Comment):patient's processed how they perceive their own sense of self-liking and how this variable is important to wellness.        Group Therapy Start Time:  2:00 PM    Attendance: Attended  Participation: Active verbal participation    Affect/Mood Range: Normal range and Flexible  Affect/Mood Display: CWC - Congruent w/Content  Cognition: Alert and Oriented    Evidence of imminent suicide risk: No   Evidence of imminent homicide risk: No     Therapeutic Interventions: Emotion clarification and Supportive psychotherapy  Progress Toward Treatment Goal: Mild improvement; \"I used to like myself better but things have changed and I like myself much less.\"     "
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
## 2022-07-06 ENCOUNTER — PATIENT MESSAGE (OUTPATIENT)
Dept: CARDIOLOGY | Facility: MEDICAL CENTER | Age: 42
End: 2022-07-06
Payer: COMMERCIAL

## 2022-07-06 ENCOUNTER — HOSPITAL ENCOUNTER (OUTPATIENT)
Dept: BEHAVIORAL HEALTH | Facility: MEDICAL CENTER | Age: 42
End: 2022-07-06
Attending: PSYCHIATRY & NEUROLOGY
Payer: COMMERCIAL

## 2022-07-06 DIAGNOSIS — F32.1 MAJOR DEPRESSIVE DISORDER, SINGLE EPISODE, MODERATE (HCC): ICD-10-CM

## 2022-07-06 DIAGNOSIS — F41.1 GENERALIZED ANXIETY DISORDER: ICD-10-CM

## 2022-07-06 PROCEDURE — 90853 GROUP PSYCHOTHERAPY: CPT | Performed by: MARRIAGE & FAMILY THERAPIST

## 2022-07-06 NOTE — GROUP NOTE
Group Appointment Information    Date: 07/06/22   Attendance Duration: 60 minutes  Number of Participants: 7 participants  Program / Group: IOP - Intensive Outpatient Program  Topics Covered: Self Affirmation        Group Therapy Start Time:  1:00 PM    Attendance: Attended  Participation: Active verbal participation and Attentive    Affect/Mood Range: Normal range  Affect/Mood Display: CWC - Congruent w/Content  Cognition: Alert and Oriented    Evidence of imminent suicide risk: No   Evidence of imminent homicide risk: No     Therapeutic Interventions: Psychoeducation and Cognitive clarification  Progress Toward Treatment Goal: Mild improvement

## 2022-07-07 ENCOUNTER — HOSPITAL ENCOUNTER (OUTPATIENT)
Dept: BEHAVIORAL HEALTH | Facility: MEDICAL CENTER | Age: 42
End: 2022-07-07
Attending: PSYCHIATRY & NEUROLOGY
Payer: COMMERCIAL

## 2022-07-07 DIAGNOSIS — F43.10 POSTTRAUMATIC STRESS DISORDER: ICD-10-CM

## 2022-07-07 DIAGNOSIS — F33.1 MODERATE RECURRENT MAJOR DEPRESSION (HCC): ICD-10-CM

## 2022-07-07 PROBLEM — F41.1 GENERALIZED ANXIETY DISORDER: Status: ACTIVE | Noted: 2022-07-07

## 2022-07-07 PROBLEM — F41.8 DEPRESSION WITH ANXIETY: Status: RESOLVED | Noted: 2022-06-30 | Resolved: 2022-07-07

## 2022-07-07 PROCEDURE — 99214 OFFICE O/P EST MOD 30 MIN: CPT | Performed by: PSYCHIATRY & NEUROLOGY

## 2022-07-07 PROCEDURE — 90853 GROUP PSYCHOTHERAPY: CPT | Performed by: MARRIAGE & FAMILY THERAPIST

## 2022-07-07 RX ORDER — FLUOXETINE HYDROCHLORIDE 20 MG/1
CAPSULE ORAL
Qty: 180 CAPSULE | Refills: 0 | Status: SHIPPED | OUTPATIENT
Start: 2022-07-07 | End: 2022-10-07

## 2022-07-07 RX ORDER — TRAZODONE HYDROCHLORIDE 50 MG/1
50 TABLET ORAL
Qty: 90 TABLET | Refills: 0 | Status: SHIPPED | OUTPATIENT
Start: 2022-07-07 | End: 2022-10-05

## 2022-07-07 NOTE — ADDENDUM NOTE
Encounter addended by: Ceasar Singh M.D. on: 7/7/2022 12:47 PM   Actions taken: Clinical Note Signed

## 2022-07-07 NOTE — GROUP NOTE
Group Appointment Information    Date: 07/07/22   Attendance Duration: 60 minutes  Number of Participants: 6 participants  Program / Group: IOP - Intensive Outpatient Program  Topics Covered: Other (Comment):neuroception and triggers for autonomic activation        Group Therapy Start Time:  1:00 PM    Attendance: Attended  Participation: Active verbal participation and Attentive    Affect/Mood Range: Normal range  Affect/Mood Display: CWC - Congruent w/Content  Cognition: Alert and Oriented    Evidence of imminent suicide risk: No   Evidence of imminent homicide risk: No     Therapeutic Interventions: Psychoeducation and Cognitive clarification  Progress Toward Treatment Goal: Mild improvement
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
## 2022-07-07 NOTE — PATIENT COMMUNICATION
To MD DARA please advise on alternative to KDur as pt has trouble swallowing medication. Thank you
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
## 2022-07-07 NOTE — GROUP NOTE
Group Appointment Information    Date: 07/06/22   Attendance Duration: 90 minutes  Number of Participants: 6 participants  Program / Group: IOP - Intensive Outpatient Program  Topics Covered: Other (Comment):Patient's processed self awareness and self-liking         Group Therapy Start Time:  2:00 PM    Attendance: Attended  Participation: Active verbal participation and Attentive    Affect/Mood Range: Normal range  Affect/Mood Display: CWC - Congruent w/Content  Cognition: Alert and Oriented    Evidence of imminent suicide risk: No   Evidence of imminent homicide risk: No     Therapeutic Interventions: Emotion clarification and Supportive psychotherapy  Progress Toward Treatment Goal: Moderate improvement; patient was supportive other fellow group members.

## 2022-07-08 ENCOUNTER — PATIENT MESSAGE (OUTPATIENT)
Dept: CARDIOLOGY | Facility: MEDICAL CENTER | Age: 42
End: 2022-07-08
Payer: COMMERCIAL

## 2022-07-08 NOTE — GROUP NOTE
Group Appointment Information    Date: 07/07/22   Attendance Duration: 90 minutes  Number of Participants: 6 participants  Program / Group: IOP - Intensive Outpatient Program  Topics Covered: Other (Comment):processed self compassion        Group Therapy Start Time:  2:00 PM    Attendance: Attended  Participation: Active verbal participation and Attentive    Affect/Mood Range: Normal range  Affect/Mood Display: CWC - Congruent w/Content  Cognition: Alert and Oriented    Evidence of imminent suicide risk: No   Evidence of imminent homicide risk: No     Therapeutic Interventions: Emotion clarification and Supportive psychotherapy  Progress Toward Treatment Goal: Mild improvement; patient wrote a self compassion letter to self.

## 2022-07-08 NOTE — PROGRESS NOTES
That is not an uncommon issue unfortunately  Increasing potassium related foods would be advisable as a substitute: Any kind of beans, raisins, spinach in addition to bananas

## 2022-07-11 ENCOUNTER — HOSPITAL ENCOUNTER (OUTPATIENT)
Dept: RADIOLOGY | Facility: MEDICAL CENTER | Age: 42
End: 2022-07-11
Attending: FAMILY MEDICINE
Payer: COMMERCIAL

## 2022-07-11 DIAGNOSIS — E78.49 OTHER HYPERLIPIDEMIA: ICD-10-CM

## 2022-07-11 PROCEDURE — 4410556 CT-CARDIAC SCORING (SELF PAY ONLY)

## 2022-07-13 ENCOUNTER — HOSPITAL ENCOUNTER (OUTPATIENT)
Dept: BEHAVIORAL HEALTH | Facility: MEDICAL CENTER | Age: 42
End: 2022-07-13
Attending: PSYCHIATRY & NEUROLOGY
Payer: COMMERCIAL

## 2022-07-13 DIAGNOSIS — F43.10 POSTTRAUMATIC STRESS DISORDER: ICD-10-CM

## 2022-07-13 DIAGNOSIS — F33.1 MODERATE RECURRENT MAJOR DEPRESSION (HCC): ICD-10-CM

## 2022-07-13 PROCEDURE — 90834 PSYTX W PT 45 MINUTES: CPT | Performed by: MARRIAGE & FAMILY THERAPIST

## 2022-07-13 PROCEDURE — 90853 GROUP PSYCHOTHERAPY: CPT | Performed by: MARRIAGE & FAMILY THERAPIST

## 2022-07-13 NOTE — GROUP NOTE
Group Appointment Information    Date: 07/13/22   Attendance Duration: 60 minutes  Number of Participants: 7 participants  Program / Group: IOP - Intensive Outpatient Program  Topics Covered: Regulating emotions        Group Therapy Start Time:  1:00 PM    Attendance: Attended  Participation: Active verbal participation and Attentive    Affect/Mood Range: Normal range  Affect/Mood Display: CWC - Congruent w/Content  Cognition: Alert and Oriented    Evidence of imminent suicide risk: No   Evidence of imminent homicide risk: No     Therapeutic Interventions: Psychoeducation and Cognitive clarification  Progress Toward Treatment Goal: No change; patient shared that she had a migraine yesterday and she was a little quiet today.

## 2022-07-14 ENCOUNTER — HOSPITAL ENCOUNTER (OUTPATIENT)
Dept: BEHAVIORAL HEALTH | Facility: MEDICAL CENTER | Age: 42
End: 2022-07-14
Attending: PSYCHIATRY & NEUROLOGY
Payer: COMMERCIAL

## 2022-07-14 ENCOUNTER — NON-PROVIDER VISIT (OUTPATIENT)
Dept: CARDIOLOGY | Facility: MEDICAL CENTER | Age: 42
End: 2022-07-14
Attending: INTERNAL MEDICINE
Payer: COMMERCIAL

## 2022-07-14 DIAGNOSIS — F43.10 POSTTRAUMATIC STRESS DISORDER: ICD-10-CM

## 2022-07-14 DIAGNOSIS — I49.3 PVC (PREMATURE VENTRICULAR CONTRACTION): ICD-10-CM

## 2022-07-14 DIAGNOSIS — R00.2 PALPITATIONS: ICD-10-CM

## 2022-07-14 DIAGNOSIS — F33.1 MODERATE RECURRENT MAJOR DEPRESSION (HCC): ICD-10-CM

## 2022-07-14 DIAGNOSIS — I49.1 APC (ATRIAL PREMATURE CONTRACTIONS): ICD-10-CM

## 2022-07-14 PROCEDURE — 90853 GROUP PSYCHOTHERAPY: CPT | Performed by: MARRIAGE & FAMILY THERAPIST

## 2022-07-14 NOTE — GROUP NOTE
Group Appointment Information    Date: 07/13/22   Attendance Duration: 90 minutes  Number of Participants: 7 participants  Program / Group: IOP - Intensive Outpatient Program  Topics Covered: Other (Comment):patients processed how to recognize and cope after being triggered.         Group Therapy Start Time:  2:00 PM    Attendance: Attended  Participation: Limited verbal participation    Affect/Mood Range: Constricted  Affect/Mood Display: CWC - Congruent w/Content  Cognition: Alert and Oriented    Evidence of imminent suicide risk: No   Evidence of imminent homicide risk: No     Therapeutic Interventions: Emotion clarification and Supportive psychotherapy  Progress Toward Treatment Goal: No change; patient was very quiet in group today.

## 2022-07-14 NOTE — PROGRESS NOTES
24 hour holter monitor placed in clinic.  SN: [id number]  Monitor ordered by: Dr. Sol.  Placed by: Cholo
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
## 2022-07-14 NOTE — GROUP NOTE
Group Appointment Information    Date: 07/14/22   Attendance Duration: 60 minutes  Number of Participants: 6 participants  Program / Group: IOP - Intensive Outpatient Program  Topics Covered: Other (Comment):Attachment style and emotional granularity        Group Therapy Start Time:  1:00 PM    Attendance: Attended  Participation: Active verbal participation and Attentive    Affect/Mood Range: Normal range and Flexible  Affect/Mood Display: CWC - Congruent w/Content  Cognition: Alert and Oriented    Evidence of imminent suicide risk: No   Evidence of imminent homicide risk: No     Therapeutic Interventions: Psychoeducation and Cognitive clarification  Progress Toward Treatment Goal: Mild improvement; patient is attentive and supportive of fellow group members

## 2022-07-15 NOTE — GROUP NOTE
Group Appointment Information    Date: 07/14/22   Attendance Duration: 90 minutes  Number of Participants: 5 participants  Program / Group: IOP - Intensive Outpatient Program  Topics Covered: Other (Comment): emotional granularity.         Group Therapy Start Time:  2:00 PM    Attendance: Attended  Participation: Active verbal participation    Affect/Mood Range: Normal range  Affect/Mood Display: CWC - Congruent w/Content  Cognition: Alert and Oriented    Evidence of imminent suicide risk: No   Evidence of imminent homicide risk: No     Therapeutic Interventions: Emotion clarification and Supportive psychotherapy  Progress Toward Treatment Goal: Mild improvement; patient reported that she feels safe and connected when she and her spouse show regard for one another.

## 2022-07-17 LAB — EKG IMPRESSION: NORMAL

## 2022-07-17 PROCEDURE — 93224 XTRNL ECG REC UP TO 48 HRS: CPT | Performed by: INTERNAL MEDICINE

## 2022-07-19 ENCOUNTER — HOSPITAL ENCOUNTER (OUTPATIENT)
Dept: BEHAVIORAL HEALTH | Facility: MEDICAL CENTER | Age: 42
End: 2022-07-19
Attending: PSYCHIATRY & NEUROLOGY
Payer: COMMERCIAL

## 2022-07-19 DIAGNOSIS — F33.1 MODERATE RECURRENT MAJOR DEPRESSION (HCC): ICD-10-CM

## 2022-07-19 DIAGNOSIS — F43.10 POSTTRAUMATIC STRESS DISORDER: ICD-10-CM

## 2022-07-19 PROCEDURE — 90853 GROUP PSYCHOTHERAPY: CPT | Performed by: MARRIAGE & FAMILY THERAPIST

## 2022-07-19 NOTE — GROUP NOTE
"Group Appointment Information    Date: 07/19/22   Attendance Duration: 90 minutes  Number of Participants: 5 participants  Program / Group: IOP - Intensive Outpatient Program  Topics Covered: Other (Comment): Processed ways to build internal resources to experience more moments of feeling \"safe and connected.\"         Group Therapy Start Time:  2:00 PM    Attendance: Attended  Participation: Limited verbal participation    Affect/Mood Range: Normal range and Flexible  Affect/Mood Display: CWC - Congruent w/Content  Cognition: Alert and Oriented    Evidence of imminent suicide risk: No   Evidence of imminent homicide risk: No     Therapeutic Interventions: Emotion clarification and Supportive psychotherapy  Progress Toward Treatment Goal: Moderate improvement; patient shared some of her personal trauma story with the group.     "

## 2022-07-19 NOTE — GROUP NOTE
Group Appointment Information    Date: 07/19/22   Attendance Duration: 60 minutes  Number of Participants: 5 participants  Program / Group: IOP - Intensive Outpatient Program  Topics Covered: Other (Comment):Autonomic activation and managing autonomic activation        Group Therapy Start Time:  1:00 PM    Attendance: Attended  Participation: Attentive and Limited verbal participation    Affect/Mood Range: Normal range  Affect/Mood Display: CWC - Congruent w/Content  Cognition: Alert and Oriented    Evidence of imminent suicide risk: No   Evidence of imminent homicide risk: No     Therapeutic Interventions: Psychoeducation and Cognitive clarification  Progress Toward Treatment Goal: Mild improvement; patient reports improved mood and some symptom of anxiety and depresion felief

## 2022-07-20 ENCOUNTER — HOSPITAL ENCOUNTER (OUTPATIENT)
Dept: BEHAVIORAL HEALTH | Facility: MEDICAL CENTER | Age: 42
End: 2022-07-20
Attending: PSYCHIATRY & NEUROLOGY
Payer: COMMERCIAL

## 2022-07-20 ENCOUNTER — PATIENT MESSAGE (OUTPATIENT)
Dept: CARDIOLOGY | Facility: MEDICAL CENTER | Age: 42
End: 2022-07-20
Payer: COMMERCIAL

## 2022-07-20 DIAGNOSIS — F33.1 MODERATE RECURRENT MAJOR DEPRESSION (HCC): ICD-10-CM

## 2022-07-20 DIAGNOSIS — F41.1 GENERALIZED ANXIETY DISORDER: ICD-10-CM

## 2022-07-20 DIAGNOSIS — F43.10 POSTTRAUMATIC STRESS DISORDER: ICD-10-CM

## 2022-07-20 PROCEDURE — 90853 GROUP PSYCHOTHERAPY: CPT | Performed by: MARRIAGE & FAMILY THERAPIST

## 2022-07-20 PROCEDURE — 90834 PSYTX W PT 45 MINUTES: CPT | Performed by: MARRIAGE & FAMILY THERAPIST

## 2022-07-20 NOTE — GROUP NOTE
Group Appointment Information    Date: 07/20/22   Attendance Duration: 60 minutes  Number of Participants: 7 participants  Program / Group: IOP - Intensive Outpatient Program  Topics Covered: Other (Comment):the stigma of mental illness        Group Therapy Start Time:  1:00 PM    Attendance: Attended  Participation: Active verbal participation and Attentive    Affect/Mood Range: Normal range  Affect/Mood Display: CWC - Congruent w/Content  Cognition: Alert and Oriented    Evidence of imminent suicide risk: No   Evidence of imminent homicide risk: No     Therapeutic Interventions: Psychoeducation and Cognitive clarification  Progress Toward Treatment Goal: Mild improvement; patient interacted with others in the group.
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
## 2022-07-21 ENCOUNTER — APPOINTMENT (OUTPATIENT)
Dept: BEHAVIORAL HEALTH | Facility: MEDICAL CENTER | Age: 42
End: 2022-07-21
Attending: PSYCHIATRY & NEUROLOGY
Payer: COMMERCIAL
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
## 2022-07-26 ENCOUNTER — HOSPITAL ENCOUNTER (OUTPATIENT)
Dept: BEHAVIORAL HEALTH | Facility: MEDICAL CENTER | Age: 42
End: 2022-07-26
Attending: PSYCHIATRY & NEUROLOGY
Payer: COMMERCIAL

## 2022-07-26 DIAGNOSIS — F33.1 MODERATE RECURRENT MAJOR DEPRESSION (HCC): ICD-10-CM

## 2022-07-26 DIAGNOSIS — F43.10 POSTTRAUMATIC STRESS DISORDER: ICD-10-CM

## 2022-07-26 PROCEDURE — 90853 GROUP PSYCHOTHERAPY: CPT | Performed by: MARRIAGE & FAMILY THERAPIST

## 2022-07-26 NOTE — GROUP NOTE
Group Appointment Information    Date: 07/26/22   Attendance Duration: 60 minutes  Number of Participants: 4 participants  Program / Group: IOP - Intensive Outpatient Program  Topics Covered: Goal setting vs living a life informed by values        Group Therapy Start Time:  1:00 PM    Attendance: Attended  Participation: Active verbal participation    Affect/Mood Range: Normal range  Affect/Mood Display: CWC - Congruent w/Content  Cognition: Alert and Oriented    Evidence of imminent suicide risk: No   Evidence of imminent homicide risk: No     Therapeutic Interventions: Psychoeducation and Cognitive clarification  Progress Toward Treatment Goal: Moderate improvement

## 2022-07-26 NOTE — GROUP NOTE
"Group Appointment Information    Date: 07/26/22   Attendance Duration: 90 minutes  Number of Participants: 5 participants  Program / Group: IOP - Intensive Outpatient Program  Topics Covered: Other (Comment): patients processed how to regulate autonomic activation.         Group Therapy Start Time:  2:00 PM    Attendance: Attended  Participation: Active verbal participation and Attentive    Affect/Mood Range: Normal range  Affect/Mood Display: CWC - Congruent w/Content  Cognition: Alert and Oriented    Evidence of imminent suicide risk: No   Evidence of imminent homicide risk: No     Therapeutic Interventions: Emotion clarification and Supportive psychotherapy  Progress Toward Treatment Goal: Mild improvement; \"I was disappointed when my  and I didn't get to go on our camping trip.  But we did work it out so we got to spend time together.     "

## 2022-07-27 ENCOUNTER — TELEPHONE (OUTPATIENT)
Dept: CARDIOLOGY | Facility: MEDICAL CENTER | Age: 42
End: 2022-07-27
Payer: COMMERCIAL

## 2022-07-27 ENCOUNTER — HOSPITAL ENCOUNTER (OUTPATIENT)
Dept: BEHAVIORAL HEALTH | Facility: MEDICAL CENTER | Age: 42
End: 2022-07-27
Attending: PSYCHIATRY & NEUROLOGY
Payer: COMMERCIAL

## 2022-07-27 DIAGNOSIS — F41.1 GENERALIZED ANXIETY DISORDER: ICD-10-CM

## 2022-07-27 DIAGNOSIS — F33.1 MODERATE RECURRENT MAJOR DEPRESSION (HCC): ICD-10-CM

## 2022-07-27 PROCEDURE — 90853 GROUP PSYCHOTHERAPY: CPT | Performed by: MARRIAGE & FAMILY THERAPIST

## 2022-07-27 NOTE — TELEPHONE ENCOUNTER
Macho Sol M.D. 13 minutes ago (3:03 PM)        Reviewed Holter monitor, looks quite good  CT coronary calcium score 0, no detectable plaque which is also reassuring for any heart disease  I believe we are still awaiting stress echocardiogram        Sent MyChart msg informing of findings per Dr. Sol.

## 2022-07-27 NOTE — PROGRESS NOTES
Reviewed Holter monitor, looks quite good  CT coronary calcium score 0, no detectable plaque which is also reassuring for any heart disease  I believe we are still awaiting stress echocardiogram

## 2022-07-27 NOTE — GROUP NOTE
Group Appointment Information    Date: 07/27/22   Attendance Duration: 60 minutes  Number of Participants: 5 participants  Program / Group: IOP - Intensive Outpatient Program  Topics Covered: Other (Comment):Acceptance and Commitment Therapy        Group Therapy Start Time:  1:00 PM    Attendance: Attended  Participation: Active verbal participation and Attentive    Affect/Mood Range: Normal range and Flexible  Affect/Mood Display: CWC - Congruent w/Content  Cognition: Alert and Oriented    Evidence of imminent suicide risk: No   Evidence of imminent homicide risk: No     Therapeutic Interventions: Psychoeducation and Cognitive clarification  Progress Toward Treatment Goal: Mild improvement; patient appeared to be feeling more comfortable in the group setting as evidenced by her more relaxed posture and smile.

## 2022-07-29 ENCOUNTER — HOSPITAL ENCOUNTER (OUTPATIENT)
Dept: RADIOLOGY | Facility: MEDICAL CENTER | Age: 42
End: 2022-07-29
Attending: FAMILY MEDICINE
Payer: COMMERCIAL

## 2022-07-29 DIAGNOSIS — N92.6 IRREGULAR MENSTRUATION: ICD-10-CM

## 2022-07-29 PROCEDURE — 76830 TRANSVAGINAL US NON-OB: CPT

## 2022-08-02 ENCOUNTER — HOSPITAL ENCOUNTER (OUTPATIENT)
Dept: BEHAVIORAL HEALTH | Facility: MEDICAL CENTER | Age: 42
End: 2022-08-02
Attending: PSYCHIATRY & NEUROLOGY
Payer: COMMERCIAL

## 2022-08-02 DIAGNOSIS — N92.6 IRREGULAR MENSTRUATION: ICD-10-CM

## 2022-08-02 DIAGNOSIS — F41.1 GENERALIZED ANXIETY DISORDER: ICD-10-CM

## 2022-08-02 DIAGNOSIS — F33.1 MODERATE RECURRENT MAJOR DEPRESSION (HCC): ICD-10-CM

## 2022-08-02 PROCEDURE — 90853 GROUP PSYCHOTHERAPY: CPT | Performed by: MARRIAGE & FAMILY THERAPIST

## 2022-08-02 NOTE — GROUP NOTE
"Group Appointment Information    Date: 08/02/22   Attendance Duration: 60 minutes  Number of Participants: 6 participants  Program / Group: IOP - Intensive Outpatient Program  Topics Covered: Cognitive distortions        Group Therapy Start Time:  1:00 PM    Attendance: Attended  Participation: Active verbal participation    Affect/Mood Range: Normal range  Affect/Mood Display: CWC - Congruent w/Content  Cognition: Alert and Oriented    Evidence of imminent suicide risk: No   Evidence of imminent homicide risk: No     Therapeutic Interventions: Psychoeducation and Cognitive clarification  Progress Toward Treatment Goal: Moderate improvement; \"I use all the cognitive distortions.\"     "

## 2022-08-03 ENCOUNTER — HOSPITAL ENCOUNTER (OUTPATIENT)
Dept: BEHAVIORAL HEALTH | Facility: MEDICAL CENTER | Age: 42
End: 2022-08-03
Attending: PSYCHIATRY & NEUROLOGY
Payer: COMMERCIAL

## 2022-08-03 DIAGNOSIS — F33.1 MODERATE RECURRENT MAJOR DEPRESSION (HCC): ICD-10-CM

## 2022-08-03 DIAGNOSIS — F41.1 GENERALIZED ANXIETY DISORDER: ICD-10-CM

## 2022-08-03 PROCEDURE — 90853 GROUP PSYCHOTHERAPY: CPT | Performed by: MARRIAGE & FAMILY THERAPIST

## 2022-08-03 NOTE — GROUP NOTE
Group Appointment Information    Date: 08/03/22   Attendance Duration: 60 minutes  Number of Participants: 6 participants  Program / Group: IOP - Intensive Outpatient Program  Topics Covered: Boundaries        Group Therapy Start Time:  1:00 PM    Attendance: Attended  Participation: Active verbal participation    Affect/Mood Range: Normal range and Flexible  Affect/Mood Display: CWC - Congruent w/Content  Cognition: Alert and Oriented    Evidence of imminent suicide risk: No   Evidence of imminent homicide risk: No     Therapeutic Interventions: Psychoeducation and Emotion clarification  Progress Toward Treatment Goal: Moderate improvement; patient appeared to relate to issues of boundaries as brought in in session as evidenced by her becoming tearful.

## 2022-08-03 NOTE — GROUP NOTE
"Group Appointment Information    Date: 08/02/22   Attendance Duration: 90 minutes  Number of Participants: 5 participants  Program / Group: IOP - Intensive Outpatient Program  Topics Covered: Other (Comment):Processed difficult feelings        Group Therapy Start Time:  2:00 PM    Attendance: Attended  Participation: Attentive    Affect/Mood Range: Normal range and Flexible  Affect/Mood Display: CWC - Congruent w/Content  Cognition: Alert and Oriented    Evidence of imminent suicide risk: No   Evidence of imminent homicide risk: No     Therapeutic Interventions: Emotion clarification and Supportive psychotherapy  Progress Toward Treatment Goal: Mild improvement; \"I feel that my  and I are talking more and I feel that we are sharing more of the responsibility for things that happen.\"     "

## 2022-08-04 ENCOUNTER — OFFICE VISIT (OUTPATIENT)
Dept: MEDICAL GROUP | Facility: LAB | Age: 42
End: 2022-08-04
Payer: COMMERCIAL

## 2022-08-04 ENCOUNTER — HOSPITAL ENCOUNTER (OUTPATIENT)
Dept: BEHAVIORAL HEALTH | Facility: MEDICAL CENTER | Age: 42
End: 2022-08-04
Attending: PSYCHIATRY & NEUROLOGY
Payer: COMMERCIAL

## 2022-08-04 VITALS
RESPIRATION RATE: 16 BRPM | HEIGHT: 64 IN | WEIGHT: 164 LBS | OXYGEN SATURATION: 96 % | SYSTOLIC BLOOD PRESSURE: 110 MMHG | TEMPERATURE: 97.8 F | DIASTOLIC BLOOD PRESSURE: 70 MMHG | HEART RATE: 67 BPM | BODY MASS INDEX: 28 KG/M2

## 2022-08-04 DIAGNOSIS — F33.1 MODERATE RECURRENT MAJOR DEPRESSION (HCC): ICD-10-CM

## 2022-08-04 DIAGNOSIS — I49.3 PVC (PREMATURE VENTRICULAR CONTRACTION): ICD-10-CM

## 2022-08-04 DIAGNOSIS — F41.1 GENERALIZED ANXIETY DISORDER: ICD-10-CM

## 2022-08-04 DIAGNOSIS — F43.10 POSTTRAUMATIC STRESS DISORDER: ICD-10-CM

## 2022-08-04 DIAGNOSIS — N92.6 IRREGULAR MENSTRUATION: ICD-10-CM

## 2022-08-04 LAB
INT CON NEG: NEGATIVE
INT CON POS: POSITIVE
POC URINE PREGNANCY TEST: NORMAL

## 2022-08-04 PROCEDURE — 99214 OFFICE O/P EST MOD 30 MIN: CPT | Performed by: FAMILY MEDICINE

## 2022-08-04 PROCEDURE — 90853 GROUP PSYCHOTHERAPY: CPT | Performed by: MARRIAGE & FAMILY THERAPIST

## 2022-08-04 PROCEDURE — 81025 URINE PREGNANCY TEST: CPT | Performed by: FAMILY MEDICINE

## 2022-08-04 RX ORDER — DROSPIRENONE AND ETHINYL ESTRADIOL 0.02-3(28)
1 KIT ORAL DAILY
Qty: 28 TABLET | Refills: 6 | Status: SHIPPED | OUTPATIENT
Start: 2022-08-04

## 2022-08-04 ASSESSMENT — FIBROSIS 4 INDEX: FIB4 SCORE: 0.84

## 2022-08-04 NOTE — PROGRESS NOTES
Chief Complaint:   Chief Complaint   Patient presents with   • Follow-Up     4 week FV       HPI: Established patient  Arabella Lux is a 42 y.o. female who presents for *follow-up, discussed the following today:    1. Irregular menstruation  Continues to have irregular menstruation, patient is not having period for a few months now, hormonal check was within normal limits.  Discussed with the patient starting her on birth control pills to see if that can help regulating her period,  Her gynecology referral is still pending did not establish yet.    2. PVC (premature ventricular contraction)  Stable, still feeling some palpitations sometimes, already established with cardiology, work-up was ordered.  No red flags    3. Moderate recurrent major depression   Diagnosed with anxiety and posttraumatic stress disorder and depression, reviewed notes from behavioral health, patient said she is feeling a little better.  She continues to take Prozac at this time it was changed by the psychiatrist.  Denies concerns or side effects now.      Past medical history, family history, social history and medications reviewed and updated in the record.  Today  Current medications, problem list and allergies reviewed in [facility] today  Health maintenance topics are reviewed and updated.    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   • Irregular menses 06/07/2022   • Anxiety 06/07/2022     Family History   Problem Relation Age of Onset   • Alcohol abuse Father    • Cancer Paternal Uncle    • Cancer Maternal Grandmother         breast ca    • Breast Cancer Maternal Grandmother    • Diabetes Maternal Grandmother    • Glaucoma Maternal Grandmother      Social History     Socioeconomic History   • Marital status:      Spouse name: Not on file   • Number of children: Not on file   • Years of education: Not on file   • Highest education level: Associate degree: occupational, technical, or vocational program   Occupational History   • Occupation: part time   Tobacco Use   • Smoking status: Current Every Day Smoker     Packs/day: 1.50     Years: 25.00     Pack years: 37.50     Types: Cigarettes   • Smokeless tobacco: Never Used   Vaping Use   • Vaping Use: Never used   Substance and Sexual Activity   • Alcohol use: Never   • Drug use: Never   • Sexual activity: Yes     Partners: Male   Other Topics Concern   • Not on file   Social History Narrative   • Not on file     Social Determinants of Health     Financial Resource Strain: Low Risk    • Difficulty of Paying Living Expenses: Not very hard   Food Insecurity: No Food Insecurity   • Worried About Running Out of Food in the Last Year: Never true   • Ran Out of Food in the Last Year: Never true   Transportation Needs: No Transportation Needs   • Lack of Transportation (Medical): No   • Lack of Transportation (Non-Medical): No   Physical Activity: Insufficiently Active   • Days of Exercise per Week: 1 day   • Minutes of Exercise per Session: 20 min   Stress: Stress Concern Present   • Feeling of Stress : To some extent   Social Connections: Moderately Isolated   • Frequency of Communication with Friends and Family: Once a week   • Frequency of Social Gatherings with Friends and Family: Once a week   • Attends [employer] Services: More than 4 times per year   • Active Member of Clubs or Organizations: No   • Attends Club or Organization Meetings: Not on file   • Marital Status:    Intimate Partner Violence: Not on file   Housing Stability: Low Risk    • Unable to Pay for Housing in the Last Year:  "No   • Number of Places Lived in the Last Year: 1   • Unstable Housing in the Last Year: No     Current Outpatient Medications   Medication Sig Dispense Refill   • drospirenone-ethinyl estradiol (VIRIDIANA) 3-0.02 MG per tablet Take 1 Tablet by mouth every day. 28 Tablet 6   • FLUoxetine (PROZAC) 20 MG Cap Take 1 caps by mouth each morning X 1 week, then increase to 2 caps by mouth each morning thereafter. 180 Capsule 0   • traZODone (DESYREL) 50 MG Tab Take 1 Tablet by mouth at bedtime as needed for Sleep for up to 90 days. 90 Tablet 0   • potassium chloride SA (KDUR) 20 MEQ Tab CR Take 1 Tablet by mouth 2 times a day. 60 Tablet 11   • ergocalciferol (DRISDOL) 84223 UNIT capsule Take 1 Capsule by mouth every 7 days. 12 Capsule 0     No current facility-administered medications for this visit.           Review Of Systems  As documented in HPI above  PHYSICAL EXAMINATION:    /70 (BP Location: Right arm, Patient Position: Sitting, BP Cuff Size: Adult)   Pulse 67   Temp 36.6 °C (97.8 °F) (Temporal)   Resp 16   Ht 1.626 m (5' 4\")   Wt 74.4 kg (164 lb)   SpO2 96%   BMI 28.15 kg/m²   Gen.: Well-developed, well-nourished, no apparent distress, pleasant and cooperative with the examination  HEENT: Normocephalic/atraumatic, Neck: No JVD or bruits, no adenopathy  Cor: Regular rate and rhythm without murmur gallop or rub  Lungs: Clear to auscultation with equal breath sounds bilaterally. No wheezes, rhonchi.    Extremities: No cyanosis, clubbing or edema        ASSESSMENT/Plan:  1. Irregular menstruation   negative pregnancy test at the office today, advised to start birth control pills for better regulation of her period, hormones were checked all within normal limits ultrasound pelvis is unremarkable.  And follow-up with gynecology  POCT Pregnancy   2. PVC (premature ventricular contraction)   chronic, patient to continue follow-up work-up with cardiology for further evaluation   3. Moderate recurrent major depression " (HCC)   chronic, established with behavioral health, stable and feeling a little bit improvement at this time, on Prozac, denies side effects.  Follow-up as directed     Please note that this dictation was created using voice recognition software. I have made every reasonable attempt to correct obvious errors but there may be errors of grammar and content that I may have overlooked prior to finalization of this note.

## 2022-08-04 NOTE — PROGRESS NOTES
" [facility]  Therapy Progress Note    Patient Name: Arabella Lux  Patient MRN: [id number]  Today's Date: 8/4/2022     Type of session:Individual psychotherapy  Length of session: 45 minutes  Persons in attendance:Patient    Subjective/New Info: Patient reports she is trying to set and keep better boundaries for herself as she said she has \"issues with boundaries.  I guess I don't know how to say no.\"  She recalled her father being an alcoholic and how her mother \"worked all the time.\"  She has been developing more self-awareness and working on having more compassion for herself.     Objective/Observations:   Participation: Active verbal participation   Grooming: Casual   Cognition: Alert and Fully Oriented   Eye contact: Good   Mood: Euthymic   Affect: Flexible and Full range   Thought process: Logical and Goal-directed   Speech: Rate within normal limits and Volume within normal limits   Other:     Diagnoses:   1. Moderate recurrent major depression (HCC)    2. Generalized anxiety disorder         Current risk:   SUICIDE: Low   Homicide: Low   Self-harm: Low   Relapse: Low   Other:    Safety Plan reviewed? No   If evidence of imminent risk is present, intervention/plan:     Therapeutic Intervention(s): Stressors assessed and Supportive psychotherapy    Treatment Goal(s)/Objective(s) addressed: Patient will continue to work on setting and enforcing boundaries for herself and will also practice showing more self-compassion.      Progress toward Treatment Goals: Significant improvement    Plan:  - Termination of IOP    JAVAD Johnson  8/4/2022                                   "

## 2022-08-05 ENCOUNTER — TELEPHONE (OUTPATIENT)
Dept: BEHAVIORAL HEALTH | Facility: MEDICAL CENTER | Age: 42
End: 2022-08-05
Payer: COMMERCIAL

## 2022-08-05 DIAGNOSIS — F33.1 MODERATE RECURRENT MAJOR DEPRESSION (HCC): ICD-10-CM

## 2022-08-05 DIAGNOSIS — F41.1 GENERALIZED ANXIETY DISORDER: ICD-10-CM

## 2022-08-05 NOTE — TELEPHONE ENCOUNTER
"[facility]  DISCHARGE SUMMARY FORM    HHPI / SCP: HHP Other Ins.:      Patient Name: Arabella Lux  Admission Date: 22  Level of Care Attended:  Intens.OP : 1980  Discharge Date: 22 MRN: [id number]         SIGNIFICANT FINDINGS/CLINICAL IMPRESSION:   DSM Codes:   IOP    ICD10 Codes:   1. Moderate recurrent major depression (HCC)    2. Generalized anxiety disorder        Additional problems identified via assessment: None    Treatment Components in Which Patient Participated (check all that apply):  Education group(s), 1:1 teaching/therapy and Group Therapy    Summary of Course of Treatment: Pt. Reports improved self-compassion, less autonomic activation and increased communication with her spouse.  She reports less dorsal vagal shutdown (depression) and more ventral vagal activation to \"safe and connected.\"     Condition at Time of Transfer/Discharge: Met treatment goals.    [] Medications Reviewed with Copy to Patient    Referred to: Refer to Pt. will be moving out of state but is starting with a [facility] provider for individual outpatient soon.      Patient is in agreement with discharge plan: yes    NICHOLAS Johnson.  "

## 2022-08-08 ENCOUNTER — OFFICE VISIT (OUTPATIENT)
Dept: BEHAVIORAL HEALTH | Facility: CLINIC | Age: 42
End: 2022-08-08
Payer: COMMERCIAL

## 2022-08-08 DIAGNOSIS — F41.1 GENERALIZED ANXIETY DISORDER: ICD-10-CM

## 2022-08-08 DIAGNOSIS — F43.10 POSTTRAUMATIC STRESS DISORDER: ICD-10-CM

## 2022-08-08 DIAGNOSIS — F33.1 MAJOR DEPRESSIVE DISORDER, RECURRENT EPISODE, MODERATE (HCC): ICD-10-CM

## 2022-08-08 PROCEDURE — 90792 PSYCH DIAG EVAL W/MED SRVCS: CPT | Performed by: PSYCHIATRY & NEUROLOGY

## 2022-08-08 RX ORDER — FLUOXETINE HYDROCHLORIDE 20 MG/1
20 CAPSULE ORAL 3 TIMES DAILY
Qty: 90 CAPSULE | Refills: 0 | Status: SHIPPED | OUTPATIENT
Start: 2022-08-08 | End: 2022-08-30 | Stop reason: SDUPTHER
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
## 2022-08-11 ENCOUNTER — HOSPITAL ENCOUNTER (OUTPATIENT)
Facility: MEDICAL CENTER | Age: 42
End: 2022-08-11
Attending: FAMILY MEDICINE
Payer: COMMERCIAL

## 2022-08-11 ENCOUNTER — OFFICE VISIT (OUTPATIENT)
Dept: MEDICAL GROUP | Facility: LAB | Age: 42
End: 2022-08-11
Payer: COMMERCIAL

## 2022-08-11 VITALS
SYSTOLIC BLOOD PRESSURE: 110 MMHG | OXYGEN SATURATION: 96 % | DIASTOLIC BLOOD PRESSURE: 66 MMHG | WEIGHT: 163 LBS | TEMPERATURE: 98.2 F | HEIGHT: 64 IN | BODY MASS INDEX: 27.83 KG/M2 | RESPIRATION RATE: 16 BRPM | HEART RATE: 80 BPM

## 2022-08-11 DIAGNOSIS — A60.00 HERPES SIMPLEX INFECTION OF GENITOURINARY SYSTEM: ICD-10-CM

## 2022-08-11 DIAGNOSIS — Z76.89 ENCOUNTER TO ESTABLISH CARE WITH NEW DOCTOR: ICD-10-CM

## 2022-08-11 DIAGNOSIS — Z12.4 CERVICAL CANCER SCREENING: ICD-10-CM

## 2022-08-11 PROCEDURE — 87624 HPV HI-RISK TYP POOLED RSLT: CPT

## 2022-08-11 PROCEDURE — 88175 CYTOPATH C/V AUTO FLUID REDO: CPT

## 2022-08-11 PROCEDURE — 99213 OFFICE O/P EST LOW 20 MIN: CPT | Mod: 25 | Performed by: FAMILY MEDICINE

## 2022-08-11 RX ORDER — VALACYCLOVIR HYDROCHLORIDE 500 MG/1
500 TABLET, FILM COATED ORAL 2 TIMES DAILY
Qty: 10 TABLET | Refills: 0 | Status: SHIPPED | OUTPATIENT
Start: 2022-08-11

## 2022-08-11 ASSESSMENT — FIBROSIS 4 INDEX: FIB4 SCORE: 0.84

## 2022-08-11 NOTE — PROGRESS NOTES
Subjective:     CC:   Chief Complaint   Patient presents with    Gynecologic Exam       HPI:   Arabella Lux is a 42 y.o. female who presents for annual exam    Patient has GYN provider: No   Last Pap Smear:    H/O Abnormal Pap: Yes, HPV positive   Last Mammogram: never had one , scheduled   Last Bone Density Test: Na  Last Colorectal Cancer Screening: NA  Last Tdap:  AS PER HX NO RECORD   Received HPV series: Aged out    Exercise: minimal exercise, one hour walking weekly  Diet: BALANCED       No LMP recorded (lmp unknown).  Hx STDs: Yes, YEARS AGO HAD CHLAMYDIA   Birth control: PILLS  Menses every month with 3-7 days with heavy bleeding.  Denies cramping.  No significant bloating/fluid retention, pelvic pain, or dyspareunia. No abnormal vaginal discharge.  No breast tenderness, mass, nipple discharge, changes in size or contour, or abnormal cyclic discomfort.    OB History    Para Term  AB Living   5 4 0 0 0 4   SAB IAB Ectopic Molar Multiple Live Births   0 0 0 0 0 0      She  reports being sexually active and has had partner(s) who are male.    She  has a past medical history of viral meningitis ().  She  has no past surgical history on file.    Family History   Problem Relation Age of Onset    Alcohol abuse Father     Cancer Paternal Uncle     Cancer Maternal Grandmother         breast ca    Breast Cancer Maternal Grandmother     Diabetes Maternal Grandmother     Glaucoma Maternal Grandmother      Social History     Tobacco Use    Smoking status: Every Day     Packs/day: 1.50     Years: 25.00     Pack years: 37.50     Types: Cigarettes    Smokeless tobacco: Never   Vaping Use    Vaping Use: Never used   Substance Use Topics    Alcohol use: Never    Drug use: Never       Patient Active Problem List    Diagnosis Date Noted    Encounter to establish care with new doctor 2022    Herpes simplex infection of genitourinary system 2022    Generalized anxiety disorder  07/07/2022    Major depressive disorder, recurrent episode, moderate (HCC) 07/07/2022    Posttraumatic stress disorder 07/07/2022    Low vitamin D level 06/30/2022    Other hyperlipidemia 06/30/2022    PVC (premature ventricular contraction) 06/07/2022    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 10 Tablet 0    FLUoxetine (PROZAC) 20 MG Cap Take 1 Capsule by mouth 3 times a day for 30 days. Takes 60 mg total every morning 90 Capsule 0    drospirenone-ethinyl estradiol (VIRIDIANA) 3-0.02 MG per tablet Take 1 Tablet by mouth every day. 28 Tablet 6    FLUoxetine (PROZAC) 20 MG Cap Take 1 caps by mouth each morning X 1 week, then increase to 2 caps by mouth each morning thereafter. 180 Capsule 0    traZODone (DESYREL) 50 MG Tab Take 1 Tablet by mouth at bedtime as needed for Sleep for up to 90 days. 90 Tablet 0    potassium chloride SA (KDUR) 20 MEQ Tab CR Take 1 Tablet by mouth 2 times a day. 60 Tablet 11    ergocalciferol (DRISDOL) 87287 UNIT capsule Take 1 Capsule by mouth every 7 days. 12 Capsule 0     No current facility-administered medications for this visit.     Allergies   Allergen Reactions    Clindamycin Vomiting and Nausea    Sulfa Drugs      Rash and upset stomach       Review of Systems   Constitutional: Negative for fever, chills and malaise/fatigue.   HENT: Negative for congestion.    Eyes: Negative for pain.   Respiratory: Negative for cough and shortness of breath.    Cardiovascular: Negative for chest pain and leg swelling.   Gastrointestinal: Negative for nausea, vomiting, abdominal pain and diarrhea.   Genitourinary: Negative for dysuria and hematuria.   Skin: Negative for rash.   Neurological: Negative for dizziness, focal weakness and headaches.   Endo/Heme/Allergies: Does not bruise/bleed  "easily.   Psychiatric/Behavioral: Negative for depression.  The patient is not nervous/anxious.      Objective:   /66 (BP Location: Right arm, Patient Position: Sitting, BP Cuff Size: Small adult)   Pulse 80   Temp 36.8 °C (98.2 °F) (Temporal)   Resp 16   Ht 1.626 m (5' 4\")   Wt 73.9 kg (163 lb)   LMP  (LMP Unknown)   SpO2 96%   BMI 27.98 kg/m²     Wt Readings from Last 4 Encounters:   08/11/22 73.9 kg (163 lb)   08/04/22 74.4 kg (164 lb)   07/05/22 75.8 kg (167 lb)   06/30/22 75.3 kg (166 lb)       A chaperone was offered to the patient during today's exam. Patient declined chaperone.    Physical Exam:  Constitutional: Well-developed and well-nourished. Not diaphoretic. No distress.   Skin: Skin is warm and dry. No rash noted.  Head: Atraumatic without lesions.  Eyes: Conjunctivae and extraocular motions are normal. Pupils are equal, round, and reactive to light. No scleral icterus.   Ears:  External ears unremarkable. Tympanic membranes clear and intact.  Nose: Nares patent. Septum midline. Turbinates without erythema nor edema. No discharge.   Mouth/Throat: Tongue normal. Oropharynx is clear and moist. Posterior pharynx without erythema or exudates.  Neck: Supple, trachea midline. Normal range of motion. No thyromegaly present. No lymphadenopathy--cervical or supraclavicular.  Cardiovascular: Regular rate and rhythm, S1 and S2 without murmur, rubs, or gallops.    Respiratory: Effort normal. Clear to auscultation throughout. No adventitious sounds.     Abdomen: Soft, non tender, and without distention. Active bowel sounds in all four quadrants. No rebound, guarding, masses or HSM.    Extremities: No cyanosis, clubbing, erythema, nor edema. Distal pulses intact and symmetric.   Musculoskeletal: All major joints AROM full in all directions without pain.  Neurological: Alert and oriented x 3. Grossly non-focal. Strength and sensation grossly intact. DTRs 2+/3 and symmetric.   Psychiatric:  Behavior, " mood, and affect are appropriate.      Normal Vulval and Vaginal NO LESIONS , MILD PAIN ON OUTER RT LABIA PATIENT RELATES TO herpes OUT BREAK  Cx WNL   Uterus WNL   Adnexa free  No masses no lesions   Pap done      Assessment and Plan:     1. Herpes simplex infection of genitourinary system  Chronic, patient reports outbreak requesting antiviral to use it for few days to help with the pain that usually she gets an outbreak in the genital area  - valACYclovir (VALTREX) 500 MG Tab; Take 1 Tablet by mouth 2 times a day.  Dispense: 10 Tablet; Refill: 0    2. Cervical cancer screening  - THINPREP PAP WITH HPV; Future        Health maintenance:     Labs per orders  Immunizations per orders  Patient counseled about skin care, diet, supplements, and exercise.  Discussed  mammography screening     Follow-up: No follow-ups on file.

## 2022-08-12 DIAGNOSIS — Z12.4 CERVICAL CANCER SCREENING: ICD-10-CM

## 2022-08-12 LAB
CYTOLOGY REG CYTOL: NORMAL
HPV HR 12 DNA CVX QL NAA+PROBE: NEGATIVE
HPV16 DNA SPEC QL NAA+PROBE: NEGATIVE
HPV18 DNA SPEC QL NAA+PROBE: NEGATIVE
SPECIMEN SOURCE: NORMAL

## 2022-08-25 ENCOUNTER — HOSPITAL ENCOUNTER (OUTPATIENT)
Facility: MEDICAL CENTER | Age: 42
End: 2022-08-25
Attending: FAMILY MEDICINE
Payer: COMMERCIAL

## 2022-08-25 ENCOUNTER — OFFICE VISIT (OUTPATIENT)
Dept: MEDICAL GROUP | Facility: LAB | Age: 42
End: 2022-08-25
Payer: COMMERCIAL

## 2022-08-25 VITALS
RESPIRATION RATE: 16 BRPM | OXYGEN SATURATION: 95 % | SYSTOLIC BLOOD PRESSURE: 110 MMHG | HEIGHT: 64 IN | WEIGHT: 164 LBS | BODY MASS INDEX: 28 KG/M2 | TEMPERATURE: 97 F | DIASTOLIC BLOOD PRESSURE: 60 MMHG | HEART RATE: 72 BPM

## 2022-08-25 DIAGNOSIS — K59.09 OTHER CONSTIPATION: ICD-10-CM

## 2022-08-25 DIAGNOSIS — K59.00 CONSTIPATION, UNSPECIFIED CONSTIPATION TYPE: ICD-10-CM

## 2022-08-25 DIAGNOSIS — N76.0 ACUTE VAGINITIS: ICD-10-CM

## 2022-08-25 PROCEDURE — 87510 GARDNER VAG DNA DIR PROBE: CPT

## 2022-08-25 PROCEDURE — 87480 CANDIDA DNA DIR PROBE: CPT

## 2022-08-25 PROCEDURE — 99214 OFFICE O/P EST MOD 30 MIN: CPT | Performed by: FAMILY MEDICINE

## 2022-08-25 PROCEDURE — 87660 TRICHOMONAS VAGIN DIR PROBE: CPT

## 2022-08-25 RX ORDER — CLOTRIMAZOLE 1 %
1 CREAM WITH APPLICATOR VAGINAL 2 TIMES DAILY
Qty: 45 G | Refills: 1 | Status: SHIPPED | OUTPATIENT
Start: 2022-08-25 | End: 2022-08-25

## 2022-08-25 RX ORDER — POLYETHYLENE GLYCOL 3350 17 G/17G
17 POWDER, FOR SOLUTION ORAL DAILY
Qty: 225 G | Refills: 1 | Status: SHIPPED | OUTPATIENT
Start: 2022-08-25

## 2022-08-25 RX ORDER — FLUCONAZOLE 150 MG/1
150 TABLET ORAL DAILY
Qty: 1 TABLET | Refills: 0 | Status: SHIPPED | OUTPATIENT
Start: 2022-08-25

## 2022-08-25 ASSESSMENT — FIBROSIS 4 INDEX: FIB4 SCORE: 0.84

## 2022-08-25 NOTE — PROGRESS NOTES
Chief Complaint:   Chief Complaint   Patient presents with    Vaginal Itching     Vaginal discharge       HPI:Established patient   Arabella Lux is a 42 y.o. female who presents for evaluation of the following    1. Acute vaginitis  New concern  Started few days ago  Patient used hot tub frequently recently in a rental home during her vacation.  Reports a lot of itching and discomfort and burning sensation with some discharge.  Same sexual partner  No pelvic pain or lower abdominal pain.    2. Constipation, unspecified constipation type  Chronic, for more than 2 years.  New to me  Patient reports episodes of constipation for several days and alternate with episodes of diarrhea, no lower abdominal pain.  Denies abdominal pain but she reports some abdominal bloating, no weight loss.  No blood in stool              Past medical history, family history, social history and medications reviewed and updated in the record. Today   Current medications, problem list and allergies reviewed in [facility] today   Health maintenance topics are reviewed and updated.    Patient Active Problem List    Diagnosis Date Noted    Encounter to establish care with new doctor 06/07/2022    Other constipation 08/25/2022    Herpes simplex infection of genitourinary system 08/11/2022    Generalized anxiety disorder 07/07/2022    Major depressive disorder, recurrent episode, moderate (HCC) 07/07/2022    Posttraumatic stress disorder 07/07/2022    Low vitamin D level 06/30/2022    Other hyperlipidemia 06/30/2022    PVC (premature ventricular contraction) 06/07/2022    Mollaret's syndrome (benign recurrent meningitis) 06/07/2022    Irregular menses 06/07/2022    Anxiety 06/07/2022     Family History   Problem Relation Age of Onset    Alcohol abuse Father     Cancer Paternal Uncle     Cancer Maternal Grandmother         breast ca    Breast Cancer Maternal Grandmother     Diabetes Maternal Grandmother     Glaucoma Maternal Grandmother      Social  History     Socioeconomic History    Marital status:      Spouse name: Not on file    Number of children: Not on file    Years of education: Not on file    Highest education level: Associate degree: occupational, technical, or vocational program   Occupational History    Occupation: part time   Tobacco Use    Smoking status: Every Day     Packs/day: 1.50     Years: 25.00     Pack years: 37.50     Types: Cigarettes    Smokeless tobacco: Never   Vaping Use    Vaping Use: Never used   Substance and Sexual Activity    Alcohol use: Never    Drug use: Never    Sexual activity: Yes     Partners: Male   Other Topics Concern    Not on file   Social History Narrative    Not on file     Social Determinants of Health     Financial Resource Strain: Low Risk     Difficulty of Paying Living Expenses: Not very hard   Food Insecurity: No Food Insecurity    Worried About Running Out of Food in the Last Year: Never true    Ran Out of Food in the Last Year: Never true   Transportation Needs: No Transportation Needs    Lack of Transportation (Medical): No    Lack of Transportation (Non-Medical): No   Physical Activity: Insufficiently Active    Days of Exercise per Week: 1 day    Minutes of Exercise per Session: 20 min   Stress: Stress Concern Present    Feeling of Stress : To some extent   Social Connections: Moderately Isolated    Frequency of Communication with Friends and Family: Once a week    Frequency of Social Gatherings with Friends and Family: Once a week    Attends Mosque Services: More than 4 times per year    Active Member of Clubs or Organizations: No    Attends Club or Organization Meetings: Not on file    Marital Status:    Intimate Partner Violence: Not on file   Housing Stability: Low Risk     Unable to Pay for Housing in the Last Year: No    Number of Places Lived in the Last Year: 1    Unstable Housing in the Last Year: No           Review Of Systems  As documented in HPI above  PHYSICAL  "EXAMINATION:    /60 (BP Location: Left arm, Patient Position: Sitting, BP Cuff Size: Adult)   Pulse 72   Temp 36.1 °C (97 °F) (Temporal)   Resp 16   Ht 1.626 m (5' 4\")   Wt 74.4 kg (164 lb)   LMP  (LMP Unknown)   SpO2 95%   BMI 28.15 kg/m²   Gen.: Well-developed, well-nourished, no apparent distress, pleasant and cooperative with the examination  HEENT: Normocephalic/atraumatic, sinuses nontender with palpation, TMs clear, nares patent with pink mucosa and clear rhinorrhea, oropharynx clear  Neck: No JVD or bruits, no adenopathy  Cor: Regular rate and rhythm without murmur gallop or rub  Genital exam: Yellowish discharge noted during her pelvic exam, no cervical excitation.  Collected sample cultures today    Abdomen: Soft nontender without hepatosplenomegaly or masses appreciated, normoactive bowel sounds  Extremities: No cyanosis, clubbing or edema        ASSESSMENT/Plan:  1. Acute vaginitis  New concern, will treat as yeast vaginitis, send cultures to lab, will wait for results and treat accordingly if not resolved come back for further assessment  VAGINAL PATHOGENS DNA PANEL    fluconazole (DIFLUCAN) 150 MG tablet    clotrimazole (LOTRIMIN) 1 % Cream      2. Constipation, unspecified constipation type  Chronic, new to me, discussed with the patient conservative management includes increase water intake, increase fiber in diet, add MiraLAX.  And IBgard over-the-counter.  If not resolved patient will be referred to see GI    polyethylene glycol 3350 (MIRALAX) 17 GM/SCOOP Powder      3. Other constipation  As above          Please note that this dictation was created using voice recognition software. I have made every reasonable attempt to correct obvious errors but there may be errors of grammar and content that I may have overlooked prior to finalization of this note.   Med    "

## 2022-08-26 ENCOUNTER — OFFICE VISIT (OUTPATIENT)
Dept: BEHAVIORAL HEALTH | Facility: CLINIC | Age: 42
End: 2022-08-26
Payer: COMMERCIAL

## 2022-08-26 ENCOUNTER — HOSPITAL ENCOUNTER (OUTPATIENT)
Dept: RADIOLOGY | Facility: MEDICAL CENTER | Age: 42
End: 2022-08-26
Attending: FAMILY MEDICINE
Payer: COMMERCIAL

## 2022-08-26 DIAGNOSIS — F43.10 POSTTRAUMATIC STRESS DISORDER: ICD-10-CM

## 2022-08-26 DIAGNOSIS — Z12.39 ENCOUNTER FOR BREAST CANCER SCREENING USING NON-MAMMOGRAM MODALITY: ICD-10-CM

## 2022-08-26 DIAGNOSIS — F41.1 GENERALIZED ANXIETY DISORDER: ICD-10-CM

## 2022-08-26 LAB
CANDIDA DNA VAG QL PROBE+SIG AMP: NEGATIVE
G VAGINALIS DNA VAG QL PROBE+SIG AMP: NEGATIVE
T VAGINALIS DNA VAG QL PROBE+SIG AMP: NEGATIVE

## 2022-08-26 PROCEDURE — 90791 PSYCH DIAGNOSTIC EVALUATION: CPT | Performed by: SOCIAL WORKER

## 2022-08-26 PROCEDURE — 76641 ULTRASOUND BREAST COMPLETE: CPT

## 2022-08-26 ASSESSMENT — ANXIETY QUESTIONNAIRES
2. NOT BEING ABLE TO STOP OR CONTROL WORRYING: SEVERAL DAYS
1. FEELING NERVOUS, ANXIOUS, OR ON EDGE: SEVERAL DAYS
3. WORRYING TOO MUCH ABOUT DIFFERENT THINGS: SEVERAL DAYS
GAD7 TOTAL SCORE: 6
7. FEELING AFRAID AS IF SOMETHING AWFUL MIGHT HAPPEN: SEVERAL DAYS
5. BEING SO RESTLESS THAT IT IS HARD TO SIT STILL: SEVERAL DAYS
4. TROUBLE RELAXING: SEVERAL DAYS
6. BECOMING EASILY ANNOYED OR IRRITABLE: NOT AT ALL

## 2022-08-26 ASSESSMENT — PATIENT HEALTH QUESTIONNAIRE - PHQ9
5. POOR APPETITE OR OVEREATING: 1 - SEVERAL DAYS
CLINICAL INTERPRETATION OF PHQ2 SCORE: 2
SUM OF ALL RESPONSES TO PHQ QUESTIONS 1-9: 7
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
## 2022-08-29 ENCOUNTER — HOSPITAL ENCOUNTER (OUTPATIENT)
Facility: MEDICAL CENTER | Age: 42
End: 2022-08-29
Payer: COMMERCIAL

## 2022-08-29 LAB — PATHOLOGY CONSULT NOTE: NORMAL

## 2022-08-29 PROCEDURE — 88305 TISSUE EXAM BY PATHOLOGIST: CPT

## 2022-08-30 ENCOUNTER — OFFICE VISIT (OUTPATIENT)
Dept: BEHAVIORAL HEALTH | Facility: CLINIC | Age: 42
End: 2022-08-30
Payer: COMMERCIAL

## 2022-08-30 DIAGNOSIS — F41.1 GENERALIZED ANXIETY DISORDER: ICD-10-CM

## 2022-08-30 DIAGNOSIS — F33.1 MAJOR DEPRESSIVE DISORDER, RECURRENT EPISODE, MODERATE (HCC): ICD-10-CM

## 2022-08-30 DIAGNOSIS — F43.10 POSTTRAUMATIC STRESS DISORDER: ICD-10-CM

## 2022-08-30 PROCEDURE — 99214 OFFICE O/P EST MOD 30 MIN: CPT | Performed by: PSYCHIATRY & NEUROLOGY

## 2022-08-30 RX ORDER — FLUOXETINE HYDROCHLORIDE 20 MG/1
20 CAPSULE ORAL 3 TIMES DAILY
Qty: 270 CAPSULE | Refills: 0 | Status: SHIPPED | OUTPATIENT
Start: 2022-08-30 | End: 2022-10-14 | Stop reason: SDUPTHER

## 2022-08-30 NOTE — PROGRESS NOTES
[facility]   Follow Up Assessment     This provider informed the patient their medical records are totally confidential except for the use by other providers involved in their care, or if the patient signs a release, or to report instances of child or elder abuse, or if it is determined they are an immediate risk to harm themselves or others.    Name: Arabella Lux  MRN: [id number]  : 1980  Age: 42 y.o.  Date of assessment: 2022  PCP: Britni Mansfield M.D.      Subjective:  Chart reviewed prior to seeing her in my office.  Increasing Prozac to 60 mg a.m. has been very beneficial.  She is feeling less depressed and prefers to stay at that dosage.  She has started psychotherapy.    Objective:  She is alert, oriented, and cooperative.  Relatedness is good.  Grooming is good.  Speech is normal rate.  Less depressed.  Memory is fairly good.  Insight and judgment are fairly good.  No indication of psychotic thinking.    Current Risk:       Suicidal: Not suicidal       Homicidal: Not homicidal       Self-Harm: No plan to harm self       Relapse: (Low/Moderate/High): Moderate       Crisis Safety Plan Reviewed: Discussed with patient    Diagnosis:   Major depressive disorder, recurrent  Generalized anxiety disorder  PTSD    Treatment Plan:  The current treatment plan consists of quarterly psychiatric sessions designed to evaluate her depression, anxiety, and PTSD.    Duration will be for a minimum of 12 months and will be reviewed at each visit.    Goals: Remission of depression and control of anxiety and PTSD symptoms in order to prevent relapse due to the chronic nature of her behavioral health problems and mental illness.  Continue Prozac 60 mg a.m. and continue psychotherapy.    Ceasar Mcginnis M.D.      This note was created using voice recognition software (Dragon). The accuracy of the dictation is limited by the abilities of the software. I have reviewed the note prior to signing, however  some errors in grammar and context are still possible. If you have any questions related to this note please do not hesitate to contact our office.

## 2022-09-02 ENCOUNTER — APPOINTMENT (OUTPATIENT)
Dept: RADIOLOGY | Facility: MEDICAL CENTER | Age: 42
End: 2022-09-02
Attending: INTERNAL MEDICINE
Payer: COMMERCIAL

## 2022-09-02 ENCOUNTER — OFFICE VISIT (OUTPATIENT)
Dept: BEHAVIORAL HEALTH | Facility: CLINIC | Age: 42
End: 2022-09-02
Payer: COMMERCIAL

## 2022-09-02 DIAGNOSIS — F43.10 POSTTRAUMATIC STRESS DISORDER: ICD-10-CM

## 2022-09-02 DIAGNOSIS — F33.1 MAJOR DEPRESSIVE DISORDER, RECURRENT EPISODE, MODERATE (HCC): ICD-10-CM

## 2022-09-02 DIAGNOSIS — F41.1 GENERALIZED ANXIETY DISORDER: ICD-10-CM

## 2022-09-02 PROCEDURE — 90834 PSYTX W PT 45 MINUTES: CPT | Performed by: PSYCHIATRY & NEUROLOGY
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
## 2022-09-09 ENCOUNTER — OFFICE VISIT (OUTPATIENT)
Dept: BEHAVIORAL HEALTH | Facility: CLINIC | Age: 42
End: 2022-09-09
Payer: COMMERCIAL

## 2022-09-09 DIAGNOSIS — F43.10 POSTTRAUMATIC STRESS DISORDER: ICD-10-CM

## 2022-09-09 DIAGNOSIS — F33.1 MAJOR DEPRESSIVE DISORDER, RECURRENT EPISODE, MODERATE (HCC): ICD-10-CM

## 2022-09-09 DIAGNOSIS — F41.1 GENERALIZED ANXIETY DISORDER: ICD-10-CM

## 2022-09-09 PROCEDURE — 90834 PSYTX W PT 45 MINUTES: CPT | Performed by: PSYCHIATRY & NEUROLOGY
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
## 2022-09-16 ENCOUNTER — OFFICE VISIT (OUTPATIENT)
Dept: BEHAVIORAL HEALTH | Facility: CLINIC | Age: 42
End: 2022-09-16
Payer: COMMERCIAL

## 2022-09-16 DIAGNOSIS — F43.10 POSTTRAUMATIC STRESS DISORDER: ICD-10-CM

## 2022-09-16 DIAGNOSIS — F33.1 MAJOR DEPRESSIVE DISORDER, RECURRENT EPISODE, MODERATE (HCC): ICD-10-CM

## 2022-09-16 DIAGNOSIS — F41.1 GENERALIZED ANXIETY DISORDER: ICD-10-CM

## 2022-09-16 PROCEDURE — 90837 PSYTX W PT 60 MINUTES: CPT | Performed by: PSYCHIATRY & NEUROLOGY
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
## 2022-09-23 ENCOUNTER — OFFICE VISIT (OUTPATIENT)
Dept: BEHAVIORAL HEALTH | Facility: CLINIC | Age: 42
End: 2022-09-23
Payer: COMMERCIAL

## 2022-09-23 DIAGNOSIS — F41.1 GENERALIZED ANXIETY DISORDER: ICD-10-CM

## 2022-09-23 DIAGNOSIS — F33.1 MAJOR DEPRESSIVE DISORDER, RECURRENT EPISODE, MODERATE (HCC): ICD-10-CM

## 2022-09-23 DIAGNOSIS — F43.10 POSTTRAUMATIC STRESS DISORDER: ICD-10-CM

## 2022-09-23 PROCEDURE — 90837 PSYTX W PT 60 MINUTES: CPT | Performed by: PSYCHIATRY & NEUROLOGY

## 2022-09-23 NOTE — PROGRESS NOTES
[facility] Therapy Progress Note      Therapy was provided on this date in coordination with the [facility] approved Clinical Supervisor under the direct supervision of  who was on site during this visit.    Therapist reviewed informed consent, limits of confidentiality and [facility] policies; patient expressed understanding and agreed to voluntarily proceed with evaluation and treatment.    Name: Arabella Lux  MRN: [id number]  : 1980  Age: 42 y.o.  Date of assessment: 2022  PCP: Britni Mansfield M.D.  Persons in attendance: Patient and Chema Baker  Total session time: 54 minutes      Topics addressed in psychotherapy include: Client reported she had a busy week starting homeschool back with the kids. We talked more about triggers and how we will continue to work with them until the sting is less shocking and she doesn't feel panicked. She wanted to talk more about the details of what happened and continue telling the story. She expressed that she is ready to start taking pieces of the media she still has (emails, pictures, videos) and look at them with me and give her formal goodbye to them. Provided validation and feedback and we mutually agreed that moving forward we will start the process of honoring and saying goodbye to parts she has still kept around.     Objective Observations:   Participation:Active verbal participation   Grooming:Casual   Cognition:Alert   Eye Contact:Good   Mood:Euthymic   Affect:Sad and Tearful   Thought Process:Logical and Goal-directed   Speech:Rate within normal limits    Current Risk:   Suicide: low   Homicide: low   Self-Harm: low   Relapse: low   Safety Plan Reviewed: not applicable    Care Plan Updated: Yes    Does patient express agreement with the above plan? Yes     Homework: Pick a piece of media to bring to next session    Next Visit: we will start the process of honoring and saying goodbye to parts of  Mane she has still kept around    Diagnosis:  GHADA  MDD  PTSD as previously reported    Referral appointment(s) scheduled?  Scheduled w/front office        Viktoriya Baker

## 2022-09-30 ENCOUNTER — OFFICE VISIT (OUTPATIENT)
Dept: BEHAVIORAL HEALTH | Facility: CLINIC | Age: 42
End: 2022-09-30
Payer: COMMERCIAL

## 2022-09-30 DIAGNOSIS — F41.1 GENERALIZED ANXIETY DISORDER: ICD-10-CM

## 2022-09-30 DIAGNOSIS — F33.1 MAJOR DEPRESSIVE DISORDER, RECURRENT EPISODE, MODERATE (HCC): ICD-10-CM

## 2022-09-30 DIAGNOSIS — F43.10 POSTTRAUMATIC STRESS DISORDER: ICD-10-CM

## 2022-09-30 PROCEDURE — 90834 PSYTX W PT 45 MINUTES: CPT | Performed by: PSYCHIATRY & NEUROLOGY

## 2022-09-30 NOTE — PROGRESS NOTES
"[facility]   Therapy Progress Note      Therapy was provided on this date in coordination with the [facility] approved Clinical Supervisor under the direct supervision of  who was on site during this visit.    Therapist reviewed informed consent, limits of confidentiality and [facility] policies; patient expressed understanding and agreed to voluntarily proceed with evaluation and treatment.    Name: Arabella Lux  MRN: [id number]  : 1980  Age: 42 y.o.  Date of assessment: 2022  PCP: Britni Mansfield M.D.  Persons in attendance: Patient and LCSMacey Baker  Total session time: 51 minutes      Topics addressed in psychotherapy include: Client brought in 2 videos and 2 emails from Mane to go over. We watched videos, honored/named feelings and then deleted them together. \"It feels good to see them and then pack them away because they don't belong here anymore.\" We read the emails which evidenced manipulation and \"gaslighting\" per client. Client reported she is feeling progress after these exercises today. We talked about repairing intimacy between her and her . Will bring more media next week as we continue to \"pack away\" what she has left for moving forward.    Objective Observations:   Participation:Active verbal participation   Grooming:Disheveled   Cognition:Fully Oriented   Eye Contact:Indirect   Mood:Depressed and Anxious   Affect:Sad, Anxious, and Tearful   Thought Process:Logical and Goal-directed   Speech:Rate within normal limits    Current Risk:   Suicide: low   Homicide: low   Self-Harm: low   Relapse: low   Safety Plan Reviewed: not applicable    Care Plan Updated: Yes    Does patient express agreement with the above plan? Yes     Homework: Bring more pieces of media. Drink water and rest    Next Visit: Continue w/exposure and expelling    Diagnosis:  GHADA  MDD  PTSD as previously reported    Referral appointment(s) scheduled?  Scheduled " w/front office        Viktoriya Baker

## 2022-10-07 ENCOUNTER — OFFICE VISIT (OUTPATIENT)
Dept: BEHAVIORAL HEALTH | Facility: CLINIC | Age: 42
End: 2022-10-07
Payer: COMMERCIAL

## 2022-10-07 DIAGNOSIS — F41.1 GENERALIZED ANXIETY DISORDER: ICD-10-CM

## 2022-10-07 DIAGNOSIS — F33.1 MAJOR DEPRESSIVE DISORDER, RECURRENT EPISODE, MODERATE (HCC): ICD-10-CM

## 2022-10-07 DIAGNOSIS — F43.10 POSTTRAUMATIC STRESS DISORDER: ICD-10-CM

## 2022-10-07 PROCEDURE — 90837 PSYTX W PT 60 MINUTES: CPT | Performed by: PSYCHIATRY & NEUROLOGY

## 2022-10-07 NOTE — PROGRESS NOTES
"[facility]   Therapy Progress Note      Therapy was provided on this date in coordination with the [facility] approved Clinical Supervisor under the direct supervision of  who was on site during this visit.    Therapist reviewed informed consent, limits of confidentiality and [facility] policies; patient expressed understanding and agreed to voluntarily proceed with evaluation and treatment.    Name: Arabella Lux  MRN: [id number]  : 1980  Age: 42 y.o.  Date of assessment: 10/7/2022  PCP: Britni Mansfield M.D.  Persons in attendance: Patient and Chema Baker  Total session time: 54 minutes      Topics addressed in psychotherapy include: Client brought more media to share in the form of emails. \"Reading these emails it's like I don't even know that woman. Why wasn't I stronger? Why didn't I leave?\" We talked about manipulative language and how it's hard to see what's going on when there is gaslighting and manipulation going on. Client expressed that she feels validated going through this media as she is revisiting it in a safe space w/unbiased party. We also touched on childhood, father was an alcoholic and she is a self-proclaimed people pleaser. We will continue to explore childhood. Client also reported that she spoke w/Guru after last session and they had a deeper conversation about her feelings and she was grateful for that.     Objective Observations:   Participation:Active verbal participation   Grooming:Inappropriate to weather   Cognition:Alert and Fully Oriented   Eye Contact:Good   Mood:Anxious   Affect:Sad, Anxious, and Tearful   Thought Process:Logical and Goal-directed   Speech:Rate within normal limits    Current Risk:   Suicide: low   Homicide: low   Self-Harm: low   Relapse: low   Safety Plan Reviewed: not applicable    Care Plan Updated: Yes    Does patient express agreement with the above plan? Yes     Homework: More media.    Next Visit: " More media. Childhood.     Diagnosis:  GHADA  MDD  PTSD    Referral appointment(s) scheduled? Yes       Viktoriya Baker

## 2022-10-14 ENCOUNTER — APPOINTMENT (OUTPATIENT)
Dept: BEHAVIORAL HEALTH | Facility: CLINIC | Age: 42
End: 2022-10-14
Payer: COMMERCIAL

## 2022-10-14 RX ORDER — FLUOXETINE HYDROCHLORIDE 20 MG/1
20 CAPSULE ORAL 3 TIMES DAILY
Qty: 270 CAPSULE | Refills: 0 | Status: SHIPPED | OUTPATIENT
Start: 2022-10-14 | End: 2023-01-12

## 2022-10-17 ENCOUNTER — APPOINTMENT (OUTPATIENT)
Dept: RADIOLOGY | Facility: MEDICAL CENTER | Age: 42
End: 2022-10-17
Attending: INTERNAL MEDICINE
Payer: COMMERCIAL

## 2022-10-20 ENCOUNTER — OFFICE VISIT (OUTPATIENT)
Dept: BEHAVIORAL HEALTH | Facility: CLINIC | Age: 42
End: 2022-10-20
Payer: COMMERCIAL

## 2022-10-20 DIAGNOSIS — F43.10 POSTTRAUMATIC STRESS DISORDER: ICD-10-CM

## 2022-10-20 DIAGNOSIS — F41.1 GENERALIZED ANXIETY DISORDER: ICD-10-CM

## 2022-10-20 DIAGNOSIS — F33.1 MAJOR DEPRESSIVE DISORDER, RECURRENT EPISODE, MODERATE (HCC): ICD-10-CM

## 2022-10-20 PROCEDURE — 90837 PSYTX W PT 60 MINUTES: CPT | Performed by: PSYCHIATRY & NEUROLOGY

## 2022-10-20 NOTE — PROGRESS NOTES
"[facility]   Therapy Progress Note      Therapy was provided on this date in coordination with the [facility] approved Clinical Supervisor under the direct supervision of  who was on site during this visit.    Therapist reviewed informed consent, limits of confidentiality and [facility] policies; patient expressed understanding and agreed to voluntarily proceed with evaluation and treatment.    Name: Arabella Lux  MRN: [id number]  : 1980  Age: 42 y.o.  Date of assessment: 10/20/2022  PCP: Britni Mansfield M.D.  Persons in attendance: Patient and LCSWi Viktoriya Baker  Total session time: 55 minutes      Topics addressed in psychotherapy include: Client caught me up on the past two weeks, said she has been spending lots of time w/kids and Guru. She feels like she missed out on a lot of time with the kids during her years w/Mane and is happy to be making up for that. We talked about visuals; imagining strings that tied her to Mane and she is cutting them away. \"I feel good that I'm letting go and that he no longer has access to me.\" She deleted her final videos and emails from Mane and said she feels sad, not because she can't see them but because she is leaving them behind where they belong and she's upset that this took so much of her energy. She mentioned that she is getting closer to a place where she is less angry and less emotional and more ready to close the door. Client appears to be making good progress.    Objective Observations:   Participation:Active verbal participation   Grooming:Casual   Cognition:Alert and Fully Oriented   Eye Contact:Good   Mood:Euthymic   Affect:Congruent with content   Thought Process:Logical and Goal-directed   Speech:Rate within normal limits and Volume within normal limits    Current Risk:   Suicide: low   Homicide: low   Self-Harm: low   Relapse: low   Safety Plan Reviewed: not applicable    Care Plan Updated: Yes    Does " patient express agreement with the above plan? Yes         Diagnosis:  GHADA  MDD  PTSD    Referral appointment(s) scheduled? Yes       Viktoriya Baker

## 2022-10-27 ENCOUNTER — APPOINTMENT (OUTPATIENT)
Dept: BEHAVIORAL HEALTH | Facility: CLINIC | Age: 42
End: 2022-10-27
Payer: COMMERCIAL

## 2022-11-03 ENCOUNTER — OFFICE VISIT (OUTPATIENT)
Dept: BEHAVIORAL HEALTH | Facility: CLINIC | Age: 42
End: 2022-11-03
Payer: COMMERCIAL

## 2022-11-03 DIAGNOSIS — F41.1 GENERALIZED ANXIETY DISORDER: ICD-10-CM

## 2022-11-03 DIAGNOSIS — F43.10 POSTTRAUMATIC STRESS DISORDER: ICD-10-CM

## 2022-11-03 DIAGNOSIS — F33.1 MAJOR DEPRESSIVE DISORDER, RECURRENT EPISODE, MODERATE (HCC): ICD-10-CM

## 2022-11-03 PROCEDURE — 90834 PSYTX W PT 45 MINUTES: CPT | Performed by: PSYCHIATRY & NEUROLOGY

## 2022-11-03 NOTE — PROGRESS NOTES
"[facility]   Therapy Progress Note      Therapy was provided on this date in coordination with the [facility] approved Clinical Supervisor under the direct supervision of  who was on site during this visit.    Therapist reviewed informed consent, limits of confidentiality and [facility] policies; patient expressed understanding and agreed to voluntarily proceed with evaluation and treatment.    Name: Arabella Lux  MRN: [id number]  : 1980  Age: 42 y.o.  Date of assessment: 11/3/2022  PCP: Britni Mansfield M.D.  Persons in attendance: Patient and Chema Baker  Total session time: 50 minutes      Topics addressed in psychotherapy include: Client reported that her triggers have been less intense and less frequent than previously reported. For the first time this session the client acknowledged her regret and expressed the desire to move forward and improve her marriage \"I never gave myself time to cry and mourn I just started picking up pieces but now I'm mourning.\" Client deleted the last of the media she was saving. Moving forward, a mutual goal is to stop checking Mane's wife's social media. Client has also agreed to writing his wife a letter as she feels she has more to say, letter not to be sent but to symbolize her closing this chapter. Will also move forward to strengthen client's marriage. Therapist provided validation and feedback.     Objective Observations:   Participation:Active verbal participation   Grooming:Casual   Cognition:Alert and Fully Oriented   Eye Contact:Good   Mood:Euthymic   Affect:Congruent with content   Thought Process:Logical and Goal-directed   Speech:Rate within normal limits and Volume within normal limits    Current Risk:   Suicide: low   Homicide: low   Self-Harm: low   Relapse: low   Safety Plan Reviewed: not applicable    Care Plan Updated: Yes    Does patient express agreement with the above plan? Yes "       Diagnosis:  GHADA  MDD  PTSD    Referral appointment(s) scheduled? Yes       Viktoriya Baker

## 2022-11-11 ENCOUNTER — OFFICE VISIT (OUTPATIENT)
Dept: BEHAVIORAL HEALTH | Facility: CLINIC | Age: 42
End: 2022-11-11
Payer: COMMERCIAL

## 2022-11-11 DIAGNOSIS — F41.1 GENERALIZED ANXIETY DISORDER: ICD-10-CM

## 2022-11-11 DIAGNOSIS — F33.1 MAJOR DEPRESSIVE DISORDER, RECURRENT EPISODE, MODERATE (HCC): ICD-10-CM

## 2022-11-11 DIAGNOSIS — F43.10 POSTTRAUMATIC STRESS DISORDER: ICD-10-CM

## 2022-11-11 PROCEDURE — 90837 PSYTX W PT 60 MINUTES: CPT | Performed by: PSYCHIATRY & NEUROLOGY

## 2022-11-11 NOTE — PROGRESS NOTES
"[facility]   Therapy Progress Note      Therapy was provided on this date in coordination with the [facility] approved Clinical Supervisor under the direct supervision of  who was on site during this visit.    Therapist reviewed informed consent, limits of confidentiality and [facility] policies; patient expressed understanding and agreed to voluntarily proceed with evaluation and treatment.    Name: Arabella Lux  MRN: [id number]  : 1980  Age: 42 y.o.  Date of assessment: 2022  PCP: Britni Mansfield M.D.  Persons in attendance: Patient and LCSWi Viktoriya Baker  Total session time: 55 minutes      Topics addressed in psychotherapy include: Client reported that she has not had any triggers this week \"I am enjoying my time and enjoying my kids, things that used to bring me happiness are doing that again. I feel more like myself.\" Client reported that she is still having intimacy issues w/Guru, we talked about that. Also talked more about Mane. Therapist provided validation and feedback as well as coping mechanism for starting to forgive herself.     Objective Observations:   Participation:Active verbal participation   Grooming:Casual   Cognition:Alert and Fully Oriented   Eye Contact:Good   Mood:Euthymic   Affect:Congruent with content, Tearful, and Angry   Thought Process:Logical and Goal-directed   Speech:Rate within normal limits and Volume within normal limits    Current Risk:   Suicide: low   Homicide: low   Self-Harm: low   Relapse: low   Safety Plan Reviewed: not applicable    Care Plan Updated: Yes    Does patient express agreement with the above plan? Yes       Diagnosis:  GHADA  MDD  PTSD    Referral appointment(s) scheduled? Yes       Viktoriya Baker   "

## 2022-11-17 ENCOUNTER — APPOINTMENT (OUTPATIENT)
Dept: BEHAVIORAL HEALTH | Facility: CLINIC | Age: 42
End: 2022-11-17
Payer: COMMERCIAL

## 2022-11-28 ENCOUNTER — APPOINTMENT (OUTPATIENT)
Dept: BEHAVIORAL HEALTH | Facility: CLINIC | Age: 42
End: 2022-11-28
Payer: COMMERCIAL

## 2022-12-14 ENCOUNTER — APPOINTMENT (OUTPATIENT)
Dept: BEHAVIORAL HEALTH | Facility: CLINIC | Age: 42
End: 2022-12-14
Payer: COMMERCIAL

## 2022-12-16 ENCOUNTER — TELEPHONE (OUTPATIENT)
Dept: HEALTH INFORMATION MANAGEMENT | Facility: OTHER | Age: 42
End: 2022-12-16

## 2022-12-28 ENCOUNTER — APPOINTMENT (OUTPATIENT)
Dept: BEHAVIORAL HEALTH | Facility: CLINIC | Age: 42
End: 2022-12-28
Payer: COMMERCIAL

## 2023-01-05 ENCOUNTER — APPOINTMENT (OUTPATIENT)
Dept: BEHAVIORAL HEALTH | Facility: CLINIC | Age: 43
End: 2023-01-05
Payer: COMMERCIAL

## 2023-01-05 ENCOUNTER — APPOINTMENT (OUTPATIENT)
Dept: MEDICAL GROUP | Facility: LAB | Age: 43
End: 2023-01-05
Payer: COMMERCIAL

## 2023-01-19 ENCOUNTER — OFFICE VISIT (OUTPATIENT)
Dept: BEHAVIORAL HEALTH | Facility: CLINIC | Age: 43
End: 2023-01-19
Payer: COMMERCIAL

## 2023-01-19 DIAGNOSIS — F43.10 POSTTRAUMATIC STRESS DISORDER: ICD-10-CM

## 2023-01-19 DIAGNOSIS — F41.1 GENERALIZED ANXIETY DISORDER: ICD-10-CM

## 2023-01-19 DIAGNOSIS — F33.1 MAJOR DEPRESSIVE DISORDER, RECURRENT EPISODE, MODERATE (HCC): ICD-10-CM

## 2023-01-19 PROCEDURE — 90834 PSYTX W PT 45 MINUTES: CPT | Performed by: PSYCHIATRY & NEUROLOGY

## 2023-01-19 NOTE — PROGRESS NOTES
"[facility]   Therapy Progress Note      Therapy was provided on this date in coordination with the [facility] approved Clinical Supervisor under the direct supervision of  who was on site during this visit.    Therapist reviewed informed consent, limits of confidentiality and [facility] policies; patient expressed understanding and agreed to voluntarily proceed with evaluation and treatment.    Name: Arabella Lux  MRN: [id number]  : 1980  Age: 42 y.o.  Date of assessment: 2023  PCP: Britni Mansfield M.D.  Persons in attendance: Patient and Chema Baker  Total session time: 50 minutes      Topics addressed in psychotherapy include:     Data: Client appeared oriented. Client reports \"I'm doing well, really well. I'm enjoying life again.\" Client last appt approx 8 weeks ago and since has reported an increase in positive mood. Client reports she has been handling triggers well and is \"back to my old self.\" Client did use portion of session to process past trauma.    Assessment: Client has responded to interventions favorably. Client continues to use emotion labeling and coping skills as suggested. Client still has some preoccupation to which we processed together during session.     Plan: Therapist offered supportive psychotherapy. Plan of care is to continue w/CBT    Objective Observations:   Participation:Active verbal participation   Grooming:Casual   Cognition:Alert and Fully Oriented   Eye Contact:Good   Mood:Happy   Affect:Bright   Thought Process:Logical   Speech:Rate within normal limits and Volume within normal limits    Current Risk:   Suicide:  Not indicated   Homicide:  Not indicated   Self-Harm:  Not indicated    Relapse:  Not indicated    Safety Plan Reviewed: not applicable        Diagnosis:  1. Generalized anxiety disorder    2. Major depressive disorder, recurrent episode, moderate (HCC)    3. Posttraumatic stress disorder        Referral " appointment(s) scheduled? Yes       Viktoriya Baker

## 2023-01-20 ENCOUNTER — OFFICE VISIT (OUTPATIENT)
Dept: BEHAVIORAL HEALTH | Facility: CLINIC | Age: 43
End: 2023-01-20
Payer: COMMERCIAL

## 2023-01-20 DIAGNOSIS — F41.1 GENERALIZED ANXIETY DISORDER: ICD-10-CM

## 2023-01-20 DIAGNOSIS — F33.1 MAJOR DEPRESSIVE DISORDER, RECURRENT EPISODE, MODERATE (HCC): ICD-10-CM

## 2023-01-20 DIAGNOSIS — F43.10 POSTTRAUMATIC STRESS DISORDER: ICD-10-CM

## 2023-01-20 PROCEDURE — 99214 OFFICE O/P EST MOD 30 MIN: CPT | Performed by: PSYCHIATRY & NEUROLOGY

## 2023-01-20 RX ORDER — DESIPRAMINE HYDROCHLORIDE 25 MG/1
25 TABLET ORAL DAILY
Qty: 30 TABLET | Refills: 0 | Status: SHIPPED | OUTPATIENT
Start: 2023-01-20 | End: 2023-02-22

## 2023-01-20 RX ORDER — FLUOXETINE HYDROCHLORIDE 20 MG/1
20 CAPSULE ORAL 2 TIMES DAILY
Qty: 60 CAPSULE | Refills: 0 | Status: SHIPPED | OUTPATIENT
Start: 2023-01-20 | End: 2023-02-21

## 2023-01-20 NOTE — PROGRESS NOTES
[facility]   Follow Up Assessment     This provider informed the patient their medical records are totally confidential except for the use by other providers involved in their care, or if the patient signs a release, or to report instances of child or elder abuse, or if it is determined they are an immediate risk to harm themselves or others.    Name: Arabella Lux  MRN: [id number]  : 1980  Age: 42 y.o.  Date of assessment: 2023  PCP: Britni Mansfield M.D.      Subjective:  Reviewed prior to seeing her in my office.  She was seen most recently on .  Prozac 60 mg a.m. has been causing problems with sleep.  She tried reducing it to 40 mg a.m. but then began to feel   more depressed.  We discussed medication options including possibly switching to Zoloft.  She is quite reluctant to discontinue Prozac because it has been so beneficial.  She is agreeable to adding desipramine 25 mg a.m.  She might be a candidate for Effexor or Wellbutrin.    Objective:  She is alert, oriented, and cooperative.  Relatedness is good.  Grooming is good.  Speech is normal rate.  Anxious.  Memory is fairly good.  Insight and judgment are fairly good.  No indication of psychotic thinking.    Current Risk:       Suicidal: Not suicidal       Homicidal: Not homicidal       Self-Harm: No plan for self-harm       Relapse: (Low/Moderate/High): Moderate       Crisis Safety Plan Reviewed: Discussed with patient    Diagnosis:   Major depressive disorder, recurrent  Generalized anxiety disorder  PTSD    Treatment Plan:  The current treatment plan consists of monthly psychiatric sessions designed to evaluate her depression, anxiety, and PTSD.    Duration will be for a minimum of 12 months and will be reviewed at each visit.    Goals: Remission of depression and control of anxiety and PTSD symptoms in order to prevent relapse due to the chronic nature of her behavioral health problems and mental illness.  Continue  Talked to father. He stated today she is doing better than before. Had poopy diapers twice today. Notified father that make sure she is not dehydrated and he can give her Pedialyte and check how she is doing. Notified him to call us if he have any question and concerns. Father voiced understanding. Prozac 40 mg a.m.  Add desipramine 25 mg a.m..  She might be a candidate for Zoloft or Wellbutrin.    Ceasar Mcginnis M.D.      This note was created using voice recognition software (Dragon). The accuracy of the dictation is limited by the abilities of the software. I have reviewed the note prior to signing, however some errors in grammar and context are still possible. If you have any questions related to this note please do not hesitate to contact our office.

## 2023-02-02 ENCOUNTER — OFFICE VISIT (OUTPATIENT)
Dept: BEHAVIORAL HEALTH | Facility: CLINIC | Age: 43
End: 2023-02-02
Payer: COMMERCIAL

## 2023-02-02 DIAGNOSIS — F41.1 GENERALIZED ANXIETY DISORDER: ICD-10-CM

## 2023-02-02 DIAGNOSIS — F33.1 MAJOR DEPRESSIVE DISORDER, RECURRENT EPISODE, MODERATE (HCC): ICD-10-CM

## 2023-02-02 DIAGNOSIS — F43.10 POSTTRAUMATIC STRESS DISORDER: ICD-10-CM

## 2023-02-02 PROCEDURE — 90834 PSYTX W PT 45 MINUTES: CPT | Performed by: PSYCHIATRY & NEUROLOGY

## 2023-02-02 NOTE — PROGRESS NOTES
[facility] Therapy Progress Note      Therapy was provided on this date in coordination with the [facility] approved Clinical Supervisor under the direct supervision of  who was on site during this visit.    Therapist reviewed informed consent, limits of confidentiality and [facility] policies; patient expressed understanding and agreed to voluntarily proceed with evaluation and treatment.    Name: Arabella Lux  MRN: [id number]  : 1980  Age: 42 y.o.  Date of assessment: 2023  PCP: Britni Mansfield M.D.  Persons in attendance: Patient and LCSMacey Baker  Total session time: 50 minutes      Topics addressed in psychotherapy include:     Data: Client appeared neat and oriented. Client reports a med change that she is adjusting too. Client used session to process past relationship trauma.     Assessment: Client expressed readiness to move on from past relationship. Client was receptive to the ending of her focus on the past and shifting focus to current relationship and rebuilding of her marriage.     Plan: Therapist offered supportive psychotherapy. Plan of care is to continue w/CBT    Objective Observations:   Participation:Active verbal participation   Grooming:Casual   Cognition:Alert and Fully Oriented   Eye Contact:Good   Mood:Euthymic   Affect:Flexible and Tearful   Thought Process:Logical and Goal-directed   Speech:Rate within normal limits and Volume within normal limits    Current Risk:   Suicide:  Not indicated   Homicide:  Not indicated   Self-Harm:  Not indicated    Relapse:  Not indicated    Safety Plan Reviewed: not applicable        Diagnosis:  1. Major depressive disorder, recurrent episode, moderate (HCC)    2. Generalized anxiety disorder    3. Posttraumatic stress disorder        Referral appointment(s) scheduled? Yes       Viktoriya Baker

## 2023-02-16 ENCOUNTER — OFFICE VISIT (OUTPATIENT)
Dept: BEHAVIORAL HEALTH | Facility: CLINIC | Age: 43
End: 2023-02-16
Payer: COMMERCIAL

## 2023-02-16 DIAGNOSIS — F41.1 GENERALIZED ANXIETY DISORDER: ICD-10-CM

## 2023-02-16 DIAGNOSIS — F43.10 POSTTRAUMATIC STRESS DISORDER: ICD-10-CM

## 2023-02-16 DIAGNOSIS — F33.1 MAJOR DEPRESSIVE DISORDER, RECURRENT EPISODE, MODERATE (HCC): ICD-10-CM

## 2023-02-16 PROCEDURE — 90834 PSYTX W PT 45 MINUTES: CPT | Performed by: PSYCHIATRY & NEUROLOGY

## 2023-02-16 NOTE — PROGRESS NOTES
"[facility]   Therapy Progress Note      Therapy was provided on this date in coordination with the [facility] approved Clinical Supervisor under the direct supervision of  who was on site during this visit.    Therapist reviewed informed consent, limits of confidentiality and [facility] policies; patient expressed understanding and agreed to voluntarily proceed with evaluation and treatment.    Name: Arabella Lux  MRN: [id number]  : 1980  Age: 42 y.o.  Date of assessment: 2023  PCP: Britni Mansfield M.D.  Persons in attendance: Patient and LCSMacey Baker  Total session time: 50 minutes      Topics addressed in psychotherapy include:     Data: Client reports increased anxiety over future and plans. Client reports anxiety over world events and news. Client endorses intrusive \"what if\" thoughts and increased hypervigilance.     Assessment: Client responds well to exposure/response therapy. Client and therapist discussed thought processes and after effects that trauma has on the body. Client is receptive to the idea of grounding when she begins to catastrophize and instead of allowing her thoughts to become out of control trying to rationally see what is in front of her.     Plan: Therapist offered supportive psychotherapy. Plan of care is to continue w/CBT and ERT.     Objective Observations:   Participation:Active verbal participation   Grooming:Casual   Cognition:Alert and Fully Oriented   Eye Contact:Good   Mood:Anxious   Affect:Anxious   Thought Process:Logical and Goal-directed   Speech:Volume within normal limits    Current Risk:   Suicide:  Not indicated   Homicide:  Not indicated   Self-Harm:  Not indicated    Relapse:  Not indicated    Safety Plan Reviewed: not applicable        Diagnosis:  1. Major depressive disorder, recurrent episode, moderate (HCC)    2. Generalized anxiety disorder    3. Posttraumatic stress disorder        Referral " appointment(s) scheduled? Yes       Viktoriya Baker

## 2023-02-20 ENCOUNTER — HOSPITAL ENCOUNTER (OUTPATIENT)
Dept: CARDIOLOGY | Facility: MEDICAL CENTER | Age: 43
End: 2023-02-20
Attending: INTERNAL MEDICINE
Payer: COMMERCIAL

## 2023-02-20 DIAGNOSIS — I49.3 PVC (PREMATURE VENTRICULAR CONTRACTION): ICD-10-CM

## 2023-02-20 DIAGNOSIS — R00.2 PALPITATIONS: ICD-10-CM

## 2023-02-20 PROCEDURE — 93306 TTE W/DOPPLER COMPLETE: CPT

## 2023-02-21 ENCOUNTER — APPOINTMENT (OUTPATIENT)
Dept: BEHAVIORAL HEALTH | Facility: CLINIC | Age: 43
End: 2023-02-21
Payer: COMMERCIAL

## 2023-02-21 RX ORDER — FLUOXETINE HYDROCHLORIDE 20 MG/1
20 CAPSULE ORAL 2 TIMES DAILY
Qty: 60 CAPSULE | Refills: 0 | Status: SHIPPED | OUTPATIENT
Start: 2023-02-21 | End: 2023-03-10 | Stop reason: SDUPTHER

## 2023-02-22 LAB
LV EJECT FRACT  99904: 60
LV EJECT FRACT MOD 2C 99903: 60.39
LV EJECT FRACT MOD 4C 99902: 60.99
LV EJECT FRACT MOD BP 99901: 62.13

## 2023-02-22 PROCEDURE — 93306 TTE W/DOPPLER COMPLETE: CPT | Mod: 26 | Performed by: INTERNAL MEDICINE

## 2023-02-22 RX ORDER — DESIPRAMINE HYDROCHLORIDE 25 MG/1
TABLET ORAL
Qty: 30 TABLET | Refills: 0 | Status: SHIPPED | OUTPATIENT
Start: 2023-02-22 | End: 2023-03-10

## 2023-03-02 ENCOUNTER — APPOINTMENT (OUTPATIENT)
Dept: BEHAVIORAL HEALTH | Facility: CLINIC | Age: 43
End: 2023-03-02
Payer: COMMERCIAL

## 2023-03-09 ENCOUNTER — OFFICE VISIT (OUTPATIENT)
Dept: BEHAVIORAL HEALTH | Facility: CLINIC | Age: 43
End: 2023-03-09
Payer: COMMERCIAL

## 2023-03-09 DIAGNOSIS — F33.1 MAJOR DEPRESSIVE DISORDER, RECURRENT EPISODE, MODERATE (HCC): ICD-10-CM

## 2023-03-09 DIAGNOSIS — F41.1 GENERALIZED ANXIETY DISORDER: ICD-10-CM

## 2023-03-09 DIAGNOSIS — F43.10 POSTTRAUMATIC STRESS DISORDER: ICD-10-CM

## 2023-03-09 PROCEDURE — 90834 PSYTX W PT 45 MINUTES: CPT | Performed by: PSYCHIATRY & NEUROLOGY

## 2023-03-09 ASSESSMENT — PATIENT HEALTH QUESTIONNAIRE - PHQ9
5. POOR APPETITE OR OVEREATING: 1 - SEVERAL DAYS
CLINICAL INTERPRETATION OF PHQ2 SCORE: 2
SUM OF ALL RESPONSES TO PHQ QUESTIONS 1-9: 9

## 2023-03-09 NOTE — PROGRESS NOTES
[facility] Therapy Progress Note      Therapy was provided on this date in coordination with the [facility] approved Clinical Supervisor under the direct supervision of  who was on site during this visit.    Therapist reviewed informed consent, limits of confidentiality and [facility] policies; patient expressed understanding and agreed to voluntarily proceed with evaluation and treatment.    Name: Arabella Lux  MRN: [id number]  : 1980  Age: 43 y.o.  Date of assessment: 3/9/2023  PCP: Britni Mansfield M.D.  Persons in attendance: Patient and Chema Baker  Total session time: 50 minutes      Topics addressed in psychotherapy include:     Data: Client appeared oriented and alert. Client reports recent increase in anxiety, client wonders if this is due to medication changes. Client reports job offer and possible relocation to [place] causing anxiety. Client reports more triggers and depressive mood.    Depression Screening    Little interest or pleasure in doing things?  1 - several days   Feeling down, depressed , or hopeless? 1 - several days   Trouble falling or staying asleep, or sleeping too much?  1 - several days   Feeling tired or having little energy?  2 - more than half the days   Poor appetite or overeating?  1 - several days   Feeling bad about yourself - or that you are a failure or have let yourself or your family down? 2 - more than half the days   Trouble concentrating on things, such as reading the newspaper or watching television? 1 - several days   Moving or speaking so slowly that other people could have noticed.  Or the opposite - being so fidgety or restless that you have been moving around a lot more than usual?  0 - not at all   Thoughts that you would be better off dead, or of hurting yourself?  0 - not at all   Patient Health Questionnaire Score: 9       If depressive symptoms identified deferred to follow up visit unless  specifically addressed in assesment and plan.    Interpretation of PHQ-9 Total Score   Score Severity   1-4 No Depression   5-9 Mild Depression   10-14 Moderate Depression   15-19 Moderately Severe Depression   20-27 Severe Depression      Assessment: Client and therapist engaged in an exploration of feelings. Client and therapist discussed cognitive restructuring of possible vs probable. Client had low energy and affect which is atypical. Client to see  tomorrow to discuss medications.     Plan: Therapist offered supportive psychotherapy. Plan of care is to continue w/CBT and ERT.     Objective Observations:   Participation:Active verbal participation   Grooming:Casual   Cognition:Alert and Fully Oriented   Eye Contact:Good   Mood:Anxious   Affect:Congruent with content   Thought Process:Logical   Speech:Rate within normal limits and Volume within normal limits    Current Risk:   Suicide:  Not indicated   Homicide:  Not indicated   Self-Harm:  Not indicated    Relapse:  Not indicated    Safety Plan Reviewed: not applicable        Diagnosis:  1. Major depressive disorder, recurrent episode, moderate (HCC)    2. Generalized anxiety disorder    3. Posttraumatic stress disorder            Viktoriya Baker

## 2023-03-10 ENCOUNTER — OFFICE VISIT (OUTPATIENT)
Dept: BEHAVIORAL HEALTH | Facility: CLINIC | Age: 43
End: 2023-03-10
Payer: COMMERCIAL

## 2023-03-10 DIAGNOSIS — F43.10 POSTTRAUMATIC STRESS DISORDER: ICD-10-CM

## 2023-03-10 DIAGNOSIS — F33.1 MAJOR DEPRESSIVE DISORDER, RECURRENT EPISODE, MODERATE (HCC): ICD-10-CM

## 2023-03-10 DIAGNOSIS — F41.1 GENERALIZED ANXIETY DISORDER: ICD-10-CM

## 2023-03-10 PROCEDURE — 99214 OFFICE O/P EST MOD 30 MIN: CPT | Performed by: PSYCHIATRY & NEUROLOGY

## 2023-03-10 RX ORDER — FLUOXETINE HYDROCHLORIDE 20 MG/1
20 CAPSULE ORAL 3 TIMES DAILY
Qty: 90 CAPSULE | Refills: 0 | Status: SHIPPED | OUTPATIENT
Start: 2023-03-10 | End: 2023-03-22

## 2023-03-10 RX ORDER — BUPROPION HYDROCHLORIDE 100 MG/1
100 TABLET, EXTENDED RELEASE ORAL DAILY
Qty: 30 TABLET | Refills: 0 | Status: SHIPPED | OUTPATIENT
Start: 2023-03-10 | End: 2023-03-24 | Stop reason: SDUPTHER

## 2023-03-10 NOTE — PROGRESS NOTES
[facility]   Follow Up Assessment     This provider informed the patient their medical records are totally confidential except for the use by other providers involved in their care, or if the patient signs a release, or to report instances of child or elder abuse, or if it is determined they are an immediate risk to harm themselves or others.    Name: Arabella Lux  MRN: [id number]  : 1980  Age: 43 y.o.  Date of assessment: 3/10/2023  PCP: Britni Mansfield M.D.      Subjective: Chart reviewed prior to seeing her in my office.  She was last seen on .  Decreasing Prozac from 60 mg a.m. to 40 mg a.m. is causing her to feel more depressed.  We had talked about a plan to cross taper from Prozac to desipramine.  Her libido is still poor.  Desipramine 25 daily has not caused any side effects but there has been no benefit at this point and she prefers to resume Prozac 60 mg a.m.  We discussed other medication options including adding Wellbutrin to the Prozac 60 mg.  She does have a good relationship with her .    Objective:  She is alert, oriented, and cooperative.  Relatedness is good.  Grooming is good.  Speech is normal rate.  Anxious and sad.  Memory is fairly good.  Insight and judgment are fairly good.  No indication of psychotic thinking.    Current Risk:       Suicidal: Not suicidal       Homicidal: Not homicidal       Self-Harm: No plan to harm self       Relapse: (Low/Moderate/High): Moderate       Crisis Safety Plan Reviewed: Discussed with patient    Diagnosis:   Major depressive disorder, recurrent  Generalized anxiety disorder  PTSD    Treatment Plan:  The current treatment plan consists of a follow-up visit in 3 weeks and then monthly psychiatric sessions designed to evaluate her depression, anxiety, and PTSD.    Duration will be for a minimum of 12 months and will be reviewed at each visit.    Goals: Remission of depression and control of anxiety in order to prevent  relapse due to the chronic nature of her behavioral health problems and mental illness.  Increase Prozac to 60 mg a.m. again.  Discontinue desipramine.  Add Wellbutrin  mg a.m.  Possible side effects discussed.    Ceasar Mcginnis M.D.      This note was created using voice recognition software (Dragon). The accuracy of the dictation is limited by the abilities of the software. I have reviewed the note prior to signing, however some errors in grammar and context are still possible. If you have any questions related to this note please do not hesitate to contact our office.

## 2023-03-11 NOTE — RESULT ENCOUNTER NOTE
Arabella I have reviewed your echocardiogram which is completely normal showing normal heart function and no heart valve problems.  Based on my original note and meeting with you back in 7/2022 I had wanted to get a stress test however based on your heart test so far including the heart monitor results, the coronary calcium score of 0 and a normal echocardiogram if you are feeling well then it probably is not necessary at this point however if you are continuing to have problems please let me know otherwise all of your tests regarding your heart function are very reassuring and these extra heartbeats, PVCs do not appear to be dangerous.  Let me know if you have any questions  .

## 2023-03-21 ENCOUNTER — APPOINTMENT (OUTPATIENT)
Dept: BEHAVIORAL HEALTH | Facility: CLINIC | Age: 43
End: 2023-03-21
Payer: COMMERCIAL

## 2023-03-22 RX ORDER — FLUOXETINE HYDROCHLORIDE 20 MG/1
CAPSULE ORAL
Qty: 60 CAPSULE | Refills: 0 | Status: SHIPPED | OUTPATIENT
Start: 2023-03-22 | End: 2023-03-24 | Stop reason: SDUPTHER

## 2023-03-24 ENCOUNTER — OFFICE VISIT (OUTPATIENT)
Dept: BEHAVIORAL HEALTH | Facility: CLINIC | Age: 43
End: 2023-03-24
Payer: COMMERCIAL

## 2023-03-24 DIAGNOSIS — F33.1 MAJOR DEPRESSIVE DISORDER, RECURRENT EPISODE, MODERATE (HCC): ICD-10-CM

## 2023-03-24 DIAGNOSIS — F41.1 GENERALIZED ANXIETY DISORDER: ICD-10-CM

## 2023-03-24 DIAGNOSIS — F43.10 POSTTRAUMATIC STRESS DISORDER: ICD-10-CM

## 2023-03-24 PROCEDURE — 99214 OFFICE O/P EST MOD 30 MIN: CPT | Performed by: PSYCHIATRY & NEUROLOGY

## 2023-03-24 RX ORDER — FLUOXETINE HYDROCHLORIDE 20 MG/1
20 CAPSULE ORAL 3 TIMES DAILY
Qty: 270 CAPSULE | Refills: 0 | Status: SHIPPED | OUTPATIENT
Start: 2023-03-24 | End: 2023-03-24

## 2023-03-24 RX ORDER — BUPROPION HYDROCHLORIDE 100 MG/1
100 TABLET, EXTENDED RELEASE ORAL DAILY
Qty: 90 TABLET | Refills: 0 | Status: SHIPPED | OUTPATIENT
Start: 2023-03-24 | End: 2023-06-22

## 2023-03-24 RX ORDER — FLUOXETINE HYDROCHLORIDE 20 MG/1
CAPSULE ORAL
Qty: 270 CAPSULE | Refills: 0 | Status: SHIPPED | OUTPATIENT
Start: 2023-03-24 | End: 2023-04-17

## 2023-03-24 NOTE — PROGRESS NOTES
[facility]   Follow Up Assessment     This provider informed the patient their medical records are totally confidential except for the use by other providers involved in their care, or if the patient signs a release, or to report instances of child or elder abuse, or if it is determined they are an immediate risk to harm themselves or others.    Name: Arabella Lux  MRN: [id number]  : 1980  Age: 43 y.o.  Date of assessment: 3/24/2023  PCP: Britni Mansfield M.D.      Subjective: Chart reviewed prior to seeing her in my office.  She was last seen on March 10.  She and her  are moving to [place] next week.  She has a job lined up there.  She likes her current combination of Prozac 60 mg a.m. with Wellbutrin  mg a.m. She prefers to not make any changes at this time.    Objective:  She is alert, oriented, and cooperative.  Relatedness is good.  Speech is still a little rapid.  Grooming is good.  Anxious.  Memory is fairly good.  Insight and judgment are fairly good.  No indication of psychotic thinking.    Current Risk:       Suicidal: Not suicidal       Homicidal: Not homicidal       Self-Harm: No plan to harm self       Relapse: (Low/Moderate/High): Moderate       Crisis Safety Plan Reviewed: Discussed with patient    Diagnosis:   Major depressive disorder, recurrent  Generalized anxiety disorder  PTSD    Treatment Plan:  She and her  are relocating to [place] next week.  Continue Prozac 60 mg a.m. and Wellbutrin  mg a.m.    Ceasar Mcginnis M.D.      This note was created using voice recognition software (Dragon). The accuracy of the dictation is limited by the abilities of the software. I have reviewed the note prior to signing, however some errors in grammar and context are still possible. If you have any questions related to this note please do not hesitate to contact our office.

## 2023-04-17 RX ORDER — FLUOXETINE HYDROCHLORIDE 20 MG/1
CAPSULE ORAL
Qty: 60 CAPSULE | Refills: 0 | Status: SHIPPED | OUTPATIENT
Start: 2023-04-17 | End: 2023-07-07

## 2023-04-17 NOTE — TELEPHONE ENCOUNTER
Received request via: Pharmacy    Was the patient seen in the last year in this department? Yes    Does the patient have an active prescription (recently filled or refills available) for medication(s) requested? No    Does the patient have [facility] Plus and need 100 day supply (blood pressure, diabetes and cholesterol meds only)? Medication is not for cholesterol, blood pressure or diabetes and Patient does not have SCP

## 2023-07-03 RX ORDER — BUPROPION HYDROCHLORIDE 100 MG/1
TABLET, EXTENDED RELEASE ORAL
Qty: 30 TABLET | Refills: 0 | Status: SHIPPED | OUTPATIENT
Start: 2023-07-03 | End: 2023-08-02

## 2023-07-03 NOTE — TELEPHONE ENCOUNTER
Received request via: Pharmacy    Was the patient seen in the last year in this department? Yes    Does the patient have an active prescription (recently filled or refills available) for medication(s) requested? No    Does the patient have [facility] Plus and need 100 day supply (blood pressure, diabetes and cholesterol meds only)? Medication is not for cholesterol, blood pressure or diabetes and Patient does not have SCP

## 2023-07-07 RX ORDER — FLUOXETINE HYDROCHLORIDE 20 MG/1
CAPSULE ORAL
Qty: 90 CAPSULE | Refills: 2 | Status: SHIPPED | OUTPATIENT
Start: 2023-07-07

## 2023-07-07 NOTE — TELEPHONE ENCOUNTER
Received request via: Pharmacy    Was the patient seen in the last year in this department? Yes    Does the patient have an active prescription (recently filled or refills available) for medication(s) requested? No    Does the patient have assisted Plus and need 100 day supply (blood pressure, diabetes and cholesterol meds only)? Medication is not for cholesterol, blood pressure or diabetes and Patient does not have SCP

## 2024-04-23 RX ORDER — FLUOXETINE HYDROCHLORIDE 20 MG/1
CAPSULE ORAL
Qty: 60 CAPSULE | Refills: 0 | Status: SHIPPED | OUTPATIENT
Start: 2024-04-23

## 2024-04-23 NOTE — TELEPHONE ENCOUNTER
Has not been seen since 3/2023    Received request via: Pharmacy    Was the patient seen in the last year in this department? No    Does the patient have an active prescription (recently filled or refills available) for medication(s) requested? No    Pharm[facility] Name: [facility]    Does the patient have [facility] Plus and need 100 day supply (blood pressure, diabetes and cholesterol meds only)? Medication is not for cholesterol, blood pressure or diabetes and Patient does not have SCP

## 2024-06-19 ENCOUNTER — DOCUMENTATION (OUTPATIENT)
Dept: HEALTH INFORMATION MANAGEMENT | Facility: OTHER | Age: 44
End: 2024-06-19
Payer: COMMERCIAL

## 2024-08-22 ENCOUNTER — TELEPHONE (OUTPATIENT)
Dept: HEALTH INFORMATION MANAGEMENT | Facility: OTHER | Age: 44
End: 2024-08-22
Payer: COMMERCIAL